# Patient Record
Sex: FEMALE | Race: WHITE | Employment: OTHER | URBAN - NONMETROPOLITAN AREA
[De-identification: names, ages, dates, MRNs, and addresses within clinical notes are randomized per-mention and may not be internally consistent; named-entity substitution may affect disease eponyms.]

---

## 2020-01-21 LAB
ALBUMIN SERPL-MCNC: NORMAL G/DL
ALP BLD-CCNC: NORMAL U/L
ALT SERPL-CCNC: NORMAL U/L
ANION GAP SERPL CALCULATED.3IONS-SCNC: NORMAL MMOL/L
AST SERPL-CCNC: NORMAL U/L
BILIRUB SERPL-MCNC: NORMAL MG/DL
BUN BLDV-MCNC: NORMAL MG/DL
CALCIUM SERPL-MCNC: NORMAL MG/DL
CHLORIDE BLD-SCNC: NORMAL MMOL/L
CHOLESTEROL, TOTAL: NORMAL
CHOLESTEROL/HDL RATIO: NORMAL
CO2: NORMAL
CREAT SERPL-MCNC: NORMAL MG/DL
GFR CALCULATED: NORMAL
GLUCOSE BLD-MCNC: NORMAL MG/DL
HDLC SERPL-MCNC: NORMAL MG/DL
LDL CHOLESTEROL CALCULATED: NORMAL
NONHDLC SERPL-MCNC: NORMAL MG/DL
POTASSIUM SERPL-SCNC: NORMAL MMOL/L
SODIUM BLD-SCNC: NORMAL MMOL/L
TOTAL PROTEIN: NORMAL
TRIGL SERPL-MCNC: NORMAL MG/DL
VLDLC SERPL CALC-MCNC: NORMAL MG/DL

## 2020-10-14 LAB — FECAL BLOOD IMMUNOCHEMICAL TEST: NORMAL

## 2021-09-30 ENCOUNTER — OFFICE VISIT (OUTPATIENT)
Dept: FAMILY MEDICINE CLINIC | Age: 76
End: 2021-09-30
Payer: MEDICARE

## 2021-09-30 VITALS
HEIGHT: 63 IN | TEMPERATURE: 96.8 F | HEART RATE: 60 BPM | SYSTOLIC BLOOD PRESSURE: 132 MMHG | BODY MASS INDEX: 25.16 KG/M2 | OXYGEN SATURATION: 98 % | WEIGHT: 142 LBS | DIASTOLIC BLOOD PRESSURE: 72 MMHG

## 2021-09-30 DIAGNOSIS — Z12.31 SCREENING MAMMOGRAM, ENCOUNTER FOR: ICD-10-CM

## 2021-09-30 DIAGNOSIS — E03.9 HYPOTHYROIDISM, UNSPECIFIED TYPE: ICD-10-CM

## 2021-09-30 DIAGNOSIS — F41.9 ANXIETY AND DEPRESSION: Primary | ICD-10-CM

## 2021-09-30 DIAGNOSIS — F32.A ANXIETY AND DEPRESSION: Primary | ICD-10-CM

## 2021-09-30 DIAGNOSIS — I10 HYPERTENSION, UNSPECIFIED TYPE: ICD-10-CM

## 2021-09-30 PROCEDURE — 99204 OFFICE O/P NEW MOD 45 MIN: CPT | Performed by: FAMILY MEDICINE

## 2021-09-30 RX ORDER — LISINOPRIL 40 MG/1
TABLET ORAL
COMMUNITY
Start: 2021-09-12 | End: 2022-06-10 | Stop reason: SDUPTHER

## 2021-09-30 RX ORDER — ESCITALOPRAM OXALATE 5 MG/1
5 TABLET ORAL DAILY
Qty: 30 TABLET | Refills: 1 | Status: SHIPPED
Start: 2021-09-30 | End: 2021-11-19 | Stop reason: SDUPTHER

## 2021-09-30 RX ORDER — LEVOTHYROXINE SODIUM 0.2 MG/1
TABLET ORAL
COMMUNITY
Start: 2021-09-12 | End: 2021-10-04

## 2021-09-30 RX ORDER — SIMVASTATIN 20 MG
TABLET ORAL
COMMUNITY
Start: 2021-09-26 | End: 2022-06-10 | Stop reason: SDUPTHER

## 2021-09-30 RX ORDER — HYDROCHLOROTHIAZIDE 25 MG/1
25 TABLET ORAL DAILY
COMMUNITY
End: 2021-09-30 | Stop reason: SDUPTHER

## 2021-09-30 RX ORDER — HYDROCHLOROTHIAZIDE 25 MG/1
25 TABLET ORAL DAILY
Qty: 90 TABLET | Refills: 1 | Status: SHIPPED
Start: 2021-09-30 | End: 2022-08-04 | Stop reason: SDUPTHER

## 2021-09-30 ASSESSMENT — PATIENT HEALTH QUESTIONNAIRE - PHQ9
SUM OF ALL RESPONSES TO PHQ9 QUESTIONS 1 & 2: 3
5. POOR APPETITE OR OVEREATING: 1
SUM OF ALL RESPONSES TO PHQ QUESTIONS 1-9: 10
1. LITTLE INTEREST OR PLEASURE IN DOING THINGS: 0
6. FEELING BAD ABOUT YOURSELF - OR THAT YOU ARE A FAILURE OR HAVE LET YOURSELF OR YOUR FAMILY DOWN: 1
3. TROUBLE FALLING OR STAYING ASLEEP: 1
8. MOVING OR SPEAKING SO SLOWLY THAT OTHER PEOPLE COULD HAVE NOTICED. OR THE OPPOSITE, BEING SO FIGETY OR RESTLESS THAT YOU HAVE BEEN MOVING AROUND A LOT MORE THAN USUAL: 1
9. THOUGHTS THAT YOU WOULD BE BETTER OFF DEAD, OR OF HURTING YOURSELF: 0
4. FEELING TIRED OR HAVING LITTLE ENERGY: 3
7. TROUBLE CONCENTRATING ON THINGS, SUCH AS READING THE NEWSPAPER OR WATCHING TELEVISION: 0
2. FEELING DOWN, DEPRESSED OR HOPELESS: 3
SUM OF ALL RESPONSES TO PHQ QUESTIONS 1-9: 10
SUM OF ALL RESPONSES TO PHQ QUESTIONS 1-9: 10
10. IF YOU CHECKED OFF ANY PROBLEMS, HOW DIFFICULT HAVE THESE PROBLEMS MADE IT FOR YOU TO DO YOUR WORK, TAKE CARE OF THINGS AT HOME, OR GET ALONG WITH OTHER PEOPLE: 1

## 2021-09-30 NOTE — PROGRESS NOTES
[address] presents to the office today for   Chief Complaint   Patient presents with    New Patient     Anxiety and Depression  Dog passed away 8 years ago  Has felt down and nervous since then  Dr. Celia Montana gave her medication that gave her diarrhea  Paxil and Zoloft she thinks    Hypertension  No chest pain or dyspnea    Hypothyroid  TSH due now    Diarrhea after restaurant meals  No blood in stool  FIT test negative last year  Normally has normal bowels    Review of Systems   Constitutional: Negative for chills and fever. Genitourinary: Negative for dysuria, hematuria and urgency. Skin: Negative for rash. Neurological: Negative for dizziness and light-headedness. /72   Pulse 60   Temp 96.8 °F (36 °C) (Temporal)   Ht 5' 3\" (1.6 m)   Wt 142 lb (64.4 kg)   SpO2 98%   BMI 25.15 kg/m²   Physical Exam  Constitutional:       Appearance: Normal appearance. HENT:      Head: Normocephalic and atraumatic. Eyes:      Extraocular Movements: Extraocular movements intact. Conjunctiva/sclera: Conjunctivae normal.   Cardiovascular:      Rate and Rhythm: Normal rate. Heart sounds: Normal heart sounds. Pulmonary:      Effort: Pulmonary effort is normal.      Breath sounds: Normal breath sounds. Skin:     General: Skin is warm. Neurological:      Mental Status: She is alert and oriented to person, place, and time.    Psychiatric:         Mood and Affect: Mood normal.         Behavior: Behavior normal.            Current Outpatient Medications:     lisinopril (PRINIVIL;ZESTRIL) 40 MG tablet, TAKE 1 TABLET BY MOUTH EVERY DAY, Disp: , Rfl:     levothyroxine (SYNTHROID) 200 MCG tablet, TAKE 1 TABLET BY MOUTH EVERY DAY, Disp: , Rfl:     simvastatin (ZOCOR) 20 MG tablet, , Disp: , Rfl:     hydroCHLOROthiazide (HYDRODIURIL) 25 MG tablet, Take 1 tablet by mouth daily, Disp: 90 tablet, Rfl: 1    escitalopram (LEXAPRO) 5 MG tablet, Take 1 tablet by mouth daily, Disp: 30 tablet, Rfl: 1     Past Medical History:   Diagnosis Date    [facility] ([facility] [address]     Anxiety     Depression     Hypercholesterolemia     Hypertension     Hypothyroidism     Sleep disorder        Faye Jj was seen today for new patient. Diagnoses and all orders for this visit:    Anxiety and depression  -     escitalopram (LEXAPRO) 5 MG tablet; Take 1 tablet by mouth daily    Screening mammogram, encounter for  -     [facility] TRINI DIGITAL SCREEN BILATERAL PER PROTOCOL; Future    Hypertension, unspecified type  -     Lipid Panel; Future  -     Comprehensive Metabolic Panel; Future    Hypothyroidism, unspecified type  -     TSH without Reflex; Future  -     T4, Free; Future    Other orders  -     hydroCHLOROthiazide (HYDRODIURIL) 25 MG tablet;  Take 1 tablet by mouth daily       Labs today  Mammogram  Start Lexapro 5 mg po qd    Jose Cruz Mauro MD

## 2021-10-04 DIAGNOSIS — E03.9 HYPOTHYROIDISM, UNSPECIFIED TYPE: Primary | ICD-10-CM

## 2021-10-04 RX ORDER — LEVOTHYROXINE SODIUM 175 UG/1
175 TABLET ORAL DAILY
Qty: 30 TABLET | Refills: 1 | Status: SHIPPED
Start: 2021-10-04 | End: 2021-11-19 | Stop reason: SDUPTHER

## 2021-11-19 ENCOUNTER — OFFICE VISIT (OUTPATIENT)
Dept: FAMILY MEDICINE CLINIC | Age: 76
End: 2021-11-19
Payer: MEDICARE

## 2021-11-19 VITALS
HEIGHT: 61 IN | TEMPERATURE: 97.1 F | DIASTOLIC BLOOD PRESSURE: 64 MMHG | HEART RATE: 71 BPM | WEIGHT: 144 LBS | OXYGEN SATURATION: 98 % | SYSTOLIC BLOOD PRESSURE: 138 MMHG | BODY MASS INDEX: 27.19 KG/M2

## 2021-11-19 DIAGNOSIS — E03.9 HYPOTHYROIDISM, UNSPECIFIED TYPE: ICD-10-CM

## 2021-11-19 DIAGNOSIS — F41.9 ANXIETY AND DEPRESSION: ICD-10-CM

## 2021-11-19 DIAGNOSIS — F32.A ANXIETY AND DEPRESSION: ICD-10-CM

## 2021-11-19 LAB
T4 FREE: 1.14 NG/DL (ref 0.93–1.7)
TSH SERPL DL<=0.05 MIU/L-ACNC: 0.01 UIU/ML (ref 0.27–4.2)

## 2021-11-19 PROCEDURE — 99214 OFFICE O/P EST MOD 30 MIN: CPT | Performed by: FAMILY MEDICINE

## 2021-11-19 RX ORDER — HYDROXYZINE HYDROCHLORIDE 25 MG/1
25 TABLET, FILM COATED ORAL EVERY 8 HOURS PRN
Qty: 90 TABLET | Refills: 1 | Status: SHIPPED
Start: 2021-11-19 | End: 2021-12-16

## 2021-11-19 RX ORDER — LEVOTHYROXINE SODIUM 175 UG/1
175 TABLET ORAL DAILY
Qty: 90 TABLET | Refills: 1 | Status: SHIPPED
Start: 2021-11-19 | End: 2021-11-23

## 2021-11-19 RX ORDER — ESCITALOPRAM OXALATE 5 MG/1
5 TABLET ORAL DAILY
Qty: 90 TABLET | Refills: 1 | Status: SHIPPED
Start: 2021-11-19 | End: 2022-08-01

## 2021-11-19 NOTE — PATIENT INSTRUCTIONS
Please continue the Escitalopram  Please take the new medication Hydroxyzine as needed for acute anxiety  Please get your thyroid blood test done today  Get your COVID booster at pharmacy  See Dr. Sheeba Elliott again 3 months

## 2021-11-19 NOTE — PROGRESS NOTES
[address] [facility] [address] presents to the office today for   Chief Complaint   Patient presents with    Anxiety     Anxiety and Depression  I started Lexapro last visit  She did not start for 11 days due to pharmacy  Gets stressed out riding in the car with her  driving    Hypothyroid  She is taking lower dose Levothyroxine 175 mcg  Due for TSH recheck today    Review of Systems     /64   Pulse 71   Temp 97.1 °F (36.2 °C) (Temporal)   Ht 5' 1\" (1.549 m)   Wt 144 lb (65.3 kg)   SpO2 98%   BMI 27.21 kg/m²   Physical Exam  Constitutional:       Appearance: Normal appearance. HENT:      Head: Normocephalic and atraumatic. Eyes:      Extraocular Movements: Extraocular movements intact. Conjunctiva/sclera: Conjunctivae normal.   Cardiovascular:      Rate and Rhythm: Normal rate. Heart sounds: Normal heart sounds. Pulmonary:      Effort: Pulmonary effort is normal.      Breath sounds: Normal breath sounds. Skin:     General: Skin is warm. Neurological:      Mental Status: She is alert and oriented to person, place, and time. Psychiatric:         Attention and Perception: Attention normal.         Mood and Affect: Mood normal.         Speech: Speech normal.         Behavior: Behavior normal.         Thought Content:  Thought content normal.         Cognition and Memory: Cognition normal.         Judgment: Judgment normal.            Current Outpatient Medications:     levothyroxine (SYNTHROID) 175 MCG tablet, Take 1 tablet by mouth daily, Disp: 90 tablet, Rfl: 1    escitalopram (LEXAPRO) 5 MG tablet, Take 1 tablet by mouth daily, Disp: 90 tablet, Rfl: 1    hydrOXYzine (ATARAX) 25 MG tablet, Take 1 tablet by mouth every 8 hours as needed for Anxiety, Disp: 90 tablet, Rfl: 1    lisinopril (PRINIVIL;ZESTRIL) 40 MG tablet, TAKE 1 TABLET BY MOUTH EVERY DAY, Disp: , Rfl:     simvastatin (ZOCOR) 20 MG tablet, , Disp: , Rfl:     hydroCHLOROthiazide (HYDRODIURIL) 25 MG tablet, Take 1 tablet by mouth daily, Disp: 90 tablet, Rfl: 1     Past Medical History:   Diagnosis Date    Alzheimer's disease ([facility] [address]     Anxiety     Depression     Hypercholesterolemia     Hypertension     Hypothyroidism     Sleep disorder        Lola Clinton was seen today for anxiety. Diagnoses and all orders for this visit:    Hypothyroidism, unspecified type  -     levothyroxine (SYNTHROID) 175 MCG tablet; Take 1 tablet by mouth daily    Anxiety and depression  -     escitalopram (LEXAPRO) 5 MG tablet; Take 1 tablet by mouth daily  -     hydrOXYzine (ATARAX) 25 MG tablet;  Take 1 tablet by mouth every 8 hours as needed for Anxiety       Please continue the Escitalopram  Please take the new medication Hydroxyzine as needed for acute anxiety  Please get your thyroid blood test done today  Get your COVID booster at pharmacy  See Dr. Deena Sam again 3 months    Keyana Cuenca MD

## 2021-11-23 DIAGNOSIS — E03.9 HYPOTHYROIDISM, UNSPECIFIED TYPE: ICD-10-CM

## 2021-11-23 RX ORDER — LEVOTHYROXINE SODIUM 0.15 MG/1
150 TABLET ORAL DAILY
Qty: 30 TABLET | Refills: 1 | Status: SHIPPED
Start: 2021-11-23 | End: 2021-12-16

## 2021-12-13 ENCOUNTER — TELEPHONE (OUTPATIENT)
Dept: FAMILY MEDICINE CLINIC | Age: 76
End: 2021-12-13

## 2021-12-13 NOTE — TELEPHONE ENCOUNTER
Patient called and stated when you saw you on 11/19/21 that she was supposed to take Levothyroxine 175mcg but the 150mcg was sent over. Which one is she supposed to take? She also is supposed to come in for blood work on 12/20/21 she said, should she still come and do that?

## 2021-12-16 DIAGNOSIS — E03.9 HYPOTHYROIDISM, UNSPECIFIED TYPE: ICD-10-CM

## 2021-12-16 DIAGNOSIS — F32.A ANXIETY AND DEPRESSION: ICD-10-CM

## 2021-12-16 DIAGNOSIS — F41.9 ANXIETY AND DEPRESSION: ICD-10-CM

## 2021-12-16 RX ORDER — HYDROXYZINE HYDROCHLORIDE 25 MG/1
25 TABLET, FILM COATED ORAL EVERY 8 HOURS PRN
Qty: 90 TABLET | Refills: 1 | Status: SHIPPED
Start: 2021-12-16 | End: 2022-01-12

## 2021-12-16 RX ORDER — LEVOTHYROXINE SODIUM 0.15 MG/1
TABLET ORAL
Qty: 90 TABLET | Refills: 1 | Status: SHIPPED
Start: 2021-12-16 | End: 2021-12-28

## 2021-12-16 NOTE — TELEPHONE ENCOUNTER
Last Appointment:  11/19/2021  Future Appointments   Date Time Provider Carmina Nielson   2/21/2022  2:00 PM Lilibeth Mott  [address] [place] [address]

## 2021-12-20 DIAGNOSIS — E03.9 HYPOTHYROIDISM, UNSPECIFIED TYPE: ICD-10-CM

## 2021-12-20 LAB
T4 FREE: 1.9 NG/DL (ref 0.93–1.7)
TSH SERPL DL<=0.05 MIU/L-ACNC: 0.01 UIU/ML (ref 0.27–4.2)

## 2021-12-28 DIAGNOSIS — E03.9 HYPOTHYROIDISM, UNSPECIFIED TYPE: ICD-10-CM

## 2021-12-28 RX ORDER — LEVOTHYROXINE SODIUM 137 UG/1
137 TABLET ORAL DAILY
Qty: 30 TABLET | Refills: 1 | Status: SHIPPED
Start: 2021-12-28 | End: 2022-08-04 | Stop reason: SDUPTHER

## 2022-01-12 DIAGNOSIS — F32.A ANXIETY AND DEPRESSION: ICD-10-CM

## 2022-01-12 DIAGNOSIS — F41.9 ANXIETY AND DEPRESSION: ICD-10-CM

## 2022-01-12 RX ORDER — HYDROXYZINE HYDROCHLORIDE 25 MG/1
25 TABLET, FILM COATED ORAL EVERY 8 HOURS PRN
Qty: 90 TABLET | Refills: 1 | Status: SHIPPED
Start: 2022-01-12 | End: 2022-02-03 | Stop reason: SDUPTHER

## 2022-01-12 NOTE — TELEPHONE ENCOUNTER
Last Appointment:  11/19/2021  Future Appointments   Date Time Provider Carmina Nielson   2/21/2022  2:00 PM Vianney Price  [address] [place] [address]

## 2022-02-03 DIAGNOSIS — F32.A ANXIETY AND DEPRESSION: ICD-10-CM

## 2022-02-03 DIAGNOSIS — F41.9 ANXIETY AND DEPRESSION: ICD-10-CM

## 2022-02-03 RX ORDER — HYDROXYZINE HYDROCHLORIDE 25 MG/1
25 TABLET, FILM COATED ORAL EVERY 8 HOURS PRN
Qty: 90 TABLET | Refills: 1 | Status: SHIPPED | OUTPATIENT
Start: 2022-02-03 | End: 2022-03-05

## 2022-02-03 NOTE — TELEPHONE ENCOUNTER
Last Appointment:  11/19/2021  Future Appointments   Date Time Provider Carmina Nielson   2/24/2022  1:00 PM Vianney Price  [address] [place] [address]

## 2022-02-24 ENCOUNTER — OFFICE VISIT (OUTPATIENT)
Dept: FAMILY MEDICINE CLINIC | Age: 77
End: 2022-02-24
Payer: MEDICARE

## 2022-02-24 VITALS
DIASTOLIC BLOOD PRESSURE: 70 MMHG | OXYGEN SATURATION: 96 % | TEMPERATURE: 97.2 F | WEIGHT: 148 LBS | SYSTOLIC BLOOD PRESSURE: 126 MMHG | HEIGHT: 61 IN | HEART RATE: 94 BPM | BODY MASS INDEX: 27.94 KG/M2

## 2022-02-24 DIAGNOSIS — E03.9 HYPOTHYROIDISM, UNSPECIFIED TYPE: ICD-10-CM

## 2022-02-24 DIAGNOSIS — E03.9 HYPOTHYROIDISM, UNSPECIFIED TYPE: Primary | ICD-10-CM

## 2022-02-24 DIAGNOSIS — F32.A ANXIETY AND DEPRESSION: ICD-10-CM

## 2022-02-24 DIAGNOSIS — I10 HYPERTENSION, UNSPECIFIED TYPE: ICD-10-CM

## 2022-02-24 DIAGNOSIS — F41.9 ANXIETY AND DEPRESSION: ICD-10-CM

## 2022-02-24 LAB
T4 FREE: 1.42 NG/DL (ref 0.93–1.7)
TSH SERPL DL<=0.05 MIU/L-ACNC: 0.01 UIU/ML (ref 0.27–4.2)

## 2022-02-24 PROCEDURE — 99214 OFFICE O/P EST MOD 30 MIN: CPT | Performed by: FAMILY MEDICINE

## 2022-02-24 NOTE — PROGRESS NOTES
[address] presents to the office today for   Chief Complaint   Patient presents with    Thyroid Problem     Anxiety and Depression  She is taking Lexapro  She is using Hydroxyzine prn     Hypothyroid  She is taking lower dose Levothyroxine 137 mcg  Due for TSH recheck today    Review of Systems   Constitutional: Negative for chills and fever. Skin: Negative for rash. Neurological: Negative for dizziness and light-headedness. /70   Pulse 94   Temp 97.2 °F (36.2 °C) (Temporal)   Ht 5' 1\" (1.549 m)   Wt 148 lb (67.1 kg)   SpO2 96%   BMI 27.96 kg/m²   Physical Exam  Constitutional:       Appearance: Normal appearance. HENT:      Head: Normocephalic and atraumatic. Eyes:      Extraocular Movements: Extraocular movements intact. Conjunctiva/sclera: Conjunctivae normal.   Cardiovascular:      Rate and Rhythm: Normal rate. Heart sounds: Normal heart sounds. Pulmonary:      Effort: Pulmonary effort is normal.      Breath sounds: Normal breath sounds. Skin:     General: Skin is warm. Neurological:      Mental Status: She is alert and oriented to person, place, and time.    Psychiatric:         Mood and Affect: Mood normal.         Behavior: Behavior normal.            Current Outpatient Medications:     hydrOXYzine (ATARAX) 25 MG tablet, TAKE 1 TABLET BY MOUTH EVERY 8 HOURS AS NEEDED FOR ANXIETY, Disp: 90 tablet, Rfl: 1    levothyroxine (SYNTHROID) 137 MCG tablet, Take 1 tablet by mouth Daily, Disp: 30 tablet, Rfl: 1    escitalopram (LEXAPRO) 5 MG tablet, Take 1 tablet by mouth daily, Disp: 90 tablet, Rfl: 1    lisinopril (PRINIVIL;ZESTRIL) 40 MG tablet, TAKE 1 TABLET BY MOUTH EVERY DAY, Disp: , Rfl:     simvastatin (ZOCOR) 20 MG tablet, , Disp: , Rfl:     hydroCHLOROthiazide (HYDRODIURIL) 25 MG tablet, Take 1 tablet by mouth daily, Disp: 90 tablet, Rfl: 1     Past Medical History:   Diagnosis Date    [facility]'s disease ([facility] [address]     Anxiety  Depression     Hypercholesterolemia     Hypertension     Hypothyroidism     Sleep disorder        Nery Blind was seen today for thyroid problem.     Diagnoses and all orders for this visit:    Hypothyroidism, unspecified type    Anxiety and depression    Hypertension, unspecified type       Check thyroid labs today  Recheck 6 months    Binta Caceres MD

## 2022-06-09 NOTE — TELEPHONE ENCOUNTER
[facility] HEALTH CLINICAL PHARMACY: ADHERENCE REVIEW  Identified care gap per [place]: fills at [facility]: ACE/ARB and Statin adherence    Last Visit: 22    ASSESSMENT  ACE/ARB ADHERENCE    Insurance Records claims through D Weston García = Filled only once; Potential Fail Date: 22):   LISINOPRIL 40MG. Next refill due: 22    Per Payor Portal:   last filled on 22 for 90 day supply. 3RF but . Will need new script    Per [facility]:   Will process refill along with her levothyroxine per patient request    BP Readings from Last 3 Encounters:   22 126/70   21 138/64   21 132/72     CrCl cannot be calculated (Patient's most recent lab result is older than the maximum 180 days allowed. ). [address] Records claims through 22  YTD Weston García = Filled only once; Potential Fail Date: 22):   SIMVASTATIN 20 MG. Next refill due: 22    Per Reconciled Dispense Report:  Same as above, LF 22 #90 3RF      Lab Results   Component Value Date    CHOL 153 2021    TRIG 142 2021    HDL 48 2021    LDLCALC 77 2021     ALT   Date Value Ref Range Status   2021 14 0 - 32 U/L Final     AST   Date Value Ref Range Status   2021 26 0 - 31 U/L Final     The 10-year ASCVD risk score (Mackenzie Jones, et al., 2013) is: 22.2%    Values used to calculate the score:      Age: 68 years      Sex: Female      Is Non- : No      Diabetic: No      Tobacco smoker: No      Systolic Blood Pressure: 631 mmHg      Is BP treated: Yes      HDL Cholesterol: 48 mg/dL      Total Cholesterol: 153 mg/dL     PLAN  The following are interventions that have been identified:   - Patient overdue refilling both and active on home medication list.     Reached patient to review. Verified medication instructions. Admits to sometimes forgetting. She will move the statin to morning-thinks that will work better for her.   Would like a refill on lisinopril and her thyroid. Both need new 90 day scripts. Verified levothyroxine 200mcg QD. She will  this weekend. [facility]    No future appointments. Lucy Garsia CPhT. [place] [address] [address] free: [phone]    Will route to PharmD to facilitate refill authorizations.

## 2022-06-10 ENCOUNTER — TELEPHONE (OUTPATIENT)
Dept: PHARMACY | Facility: CLINIC | Age: 77
End: 2022-06-10

## 2022-06-10 DIAGNOSIS — F32.A ANXIETY AND DEPRESSION: ICD-10-CM

## 2022-06-10 DIAGNOSIS — F41.9 ANXIETY AND DEPRESSION: ICD-10-CM

## 2022-06-10 NOTE — TELEPHONE ENCOUNTER
Renee Hodgkin, MD - patient was asking for refill of levothyroxine. Wanted to confirm what dose you would like her to take? · She has NOT been taking 137 mcg tab as instructed/current med list (confirmed with pharmacy this dose was never filled). She has apparently been using 200 mcg tabs. · Last lab 2/24/22 TSH low, FT4 WNL but does not appear to reflect 137 mcg daily. Likely was taking 150 mcg or possibly 200 mcg tabs at the time. Per most recent discussion with patient (see 6/9/22 pharmacy encounter) has been using 200 mcg daily. I believe labs prior to 2/24/22 do reflect the doses you had instructed her to take at that time    Please advise and I can follow up with the patient if you would like. She has multiple active Rxs/doses at pharmacy - I can have them get ready the dose you would like her to take and deactivate other doses to avoid confusion in the future. Thank you,  Geoff Quiroz, PharmD, [facility]  Department, toll free: [phone]   =================================================================  See 6/9/22 telephone encounter for continuous care. Pt was asking for refill of levothyroxine 200 mcg tab but noted levothyroxine 137 mcg tab on current med list.    Spoke with pharmacy to confirm Rx history:  - levothyroxine was last filled in January for 200 mcg. Rx on hold for 137 mcg - has never been filled but still active. Also has active on profile for 150 mcg. Per review:   - pt was on levothyroxine 200 mcg daily when transferred to practice 9/30/21 OV  - dose decreased to 175 mcg 9/30/21 (lab result note). Rx for 175 mcg filled 10/7/22, 11/3 x 30 ds each  - dose decreased to 150 mcg 11/19/21 (lab result note). Rx for 150 mcg tab filled 11/23/21 x 30 ds, 12/16/22 x 90 ds  - dose decreased to 137 mcg 12/20/21 (lab result note).  Rx for 137 mcg tab was never filled   - 2/24/22 TSH low, FT4 WNL but does not appear to reflect 137 mcg daily. Likely was taking 150 mcg or possibly 200 mcg tabs at the time. Per most recent discussion with patient has been using 200 mcg daily    Fill history per reconcile dispense:  LEVOTHYROXINE 200 MCG TABLET 01/02/2022 90 90 each WITTENAUER,VENUS ABDOUL [id number]- B. .. LEVOTHYROXINE 150 MCG TABLET 12/16/2021 90 [id number] .. JEANETTE LEVOTHYROXINE SODIUM 175MCG TAB 12/07/2021 90 90 tablet [id number]. .. LEVOTHYROXINE 150 MCG TABLET 11/23/2021 30 30 each HAYLIE LEZAMA [facility]/pharmacy #[id number]- B. .. LEVOTHYROXINE 175 MCG TABLET 11/03/2021 30 30 each HAYLIE LEZAMA [facility]/pharmacy #[id number]- B. .. LEVOTHYROXINE 175 MCG TABLET 10/07/2021 30 30 each DELTAHAYLIE [facility]/pharmacy #[id number]- B. .. LEVOTHYROXINE 200 MCG TABLET 09/12/2021 90 90 [id number]. .. LEVOTHYROXINE 200 MCG TABLET 06/04/2021 90 90 each WITTENAUER,VENUS ABDOUL [id number]- B. ..      Component      Latest Ref Rng & Units 2/24/2022 2/24/2022 12/20/2021 12/20/2021           1:22 PM  1:22 PM  2:19 PM  2:19 PM   T4 Free      0.93 - 1.70 ng/dL 1.42   1.90 (H)   TSH      0.270 - 4.200 uIU/mL  0.013 (L) 0.012 (L)      Component      Latest Ref Rng & Units 11/19/2021 11/19/2021 9/30/2021 9/30/2021          11:23 AM 11:23 AM  2:01 PM  2:01 PM   T4 Free      0.93 - 1.70 ng/dL 1.14   2.46 (H)   TSH      0.270 - 4.200 uIU/mL  0.014 (L) 0.010 (L)

## 2022-06-10 NOTE — TELEPHONE ENCOUNTER
Aimee Cantu MD, patient out of refills of lisinopril & simvastatin. Rx pended for your signature/modification as appropriate    LOV: 2/24/22  Next: to be scheduled    Thank you,  Jayne Bolden, PharmD, [facility]  Department, toll free: [phone]   ==========================================================  Per review meds Rxed from Dr. Kika Tovar. Appears pt transferred from Dr. Kika Tovar to Dr. Ksenia Oliver 9/30/2021. Confirmed with pharmacy new Rxs needed for lisinopril & simvastatin fills. See 6/10/22 encounter for further information regarding levothyroxine - do not believe has been taking prescribed dose of 137 mcg daily, last filled for 200 mcg tab    LISINOPRIL 40 MG TABLET 11/28/2021 90 [id number] .. LISINOPRIL 40 MG TABLET 09/12/2021 90 90 each [facility]/pharmacy #[id number]- B. .. SIMVASTATIN 20 MG TABLET 01/02/2022 90 90 each [facility]/pharmacy #[id number]- B. .. SIMVASTATIN 20 MG TABLET 09/26/2021 90 90 each [facility]/pharmacy #[id number]- B. .. SIMVASTATIN 20 MG TABLET 06/27/2021 90 90 [id number] B. .. SIMVASTATIN 20 MG TABLET 03/27/2021 90 90 each [facility]/pharmacy #[id number]- B. .. SIMVASTATIN 20 MG TABLET 12/26/2020 90 90 each [facility]/pharmacy #[id number]- B. ..

## 2022-06-13 RX ORDER — LISINOPRIL 40 MG/1
TABLET ORAL
Qty: 90 TABLET | Refills: 0 | Status: SHIPPED
Start: 2022-06-13 | End: 2022-08-04 | Stop reason: SDUPTHER

## 2022-06-13 RX ORDER — SIMVASTATIN 20 MG
20 TABLET ORAL NIGHTLY
Qty: 90 TABLET | Refills: 0 | Status: SHIPPED
Start: 2022-06-13 | End: 2022-08-04 | Stop reason: SDUPTHER

## 2022-06-14 NOTE — TELEPHONE ENCOUNTER
Please advise patient to  Levothyroxine 137 mcg per day and we will need to recheck in 6 weeks on this dose  Please confirm she can get it filled and get lab done and I will send new Rx if needed and place order for labs

## 2022-06-15 NOTE — TELEPHONE ENCOUNTER
Patient advised and will  the script this week. Once she does that, she will call and schedule the 6 week follow up.

## 2022-06-17 NOTE — TELEPHONE ENCOUNTER
Noted Rxs signed by provider - thank you! Regarding levothyroxine was advised to take levothyroxine 137 mcg daily and recheck in 6 weeks. Called to [facility] pharmacy. Levothyroxine 137 was on hold - they will fill. Had them also deactivate other strengths. Confirmed simvastatin & lisinopril picked up .     For Jose Samayoa in place:  No   Recommendation Provided To: Provider: 3 via Note to Provider, Patient/Caregiver: 2 via Telephone and Pharmacy: 2   Intervention Detail: Adherence Monitorin, Discontinued Rx: 1, reason: Therapy Complete and Refill(s) Provided   Gap Closed?: Yes    Intervention Accepted By: Provider: 3, Patient/Caregiver: 2 and Pharmacy: 2   Time Spent (min): 30

## 2022-06-17 NOTE — TELEPHONE ENCOUNTER
Provider response and staff outreach noted - thank you! Called to [facility], updated pt profile - they will fill levothyroxine 137 mcg tab and deactivate other strengths.

## 2022-07-30 DIAGNOSIS — F32.A ANXIETY AND DEPRESSION: ICD-10-CM

## 2022-07-30 DIAGNOSIS — F41.9 ANXIETY AND DEPRESSION: ICD-10-CM

## 2022-08-01 RX ORDER — ESCITALOPRAM OXALATE 5 MG/1
TABLET ORAL
Qty: 90 TABLET | Refills: 1 | Status: SHIPPED
Start: 2022-08-01 | End: 2022-08-04 | Stop reason: SDUPTHER

## 2022-08-01 NOTE — TELEPHONE ENCOUNTER
Last Appointment:  2/24/2022  Future Appointments   Date Time Provider Carmina Nielson   8/4/2022  2:00 PM Quiana Shahid  [address] [place] [address]

## 2022-08-04 ENCOUNTER — OFFICE VISIT (OUTPATIENT)
Dept: FAMILY MEDICINE CLINIC | Age: 77
End: 2022-08-04
Payer: MEDICARE

## 2022-08-04 VITALS
WEIGHT: 148 LBS | SYSTOLIC BLOOD PRESSURE: 124 MMHG | HEIGHT: 61 IN | OXYGEN SATURATION: 98 % | HEART RATE: 58 BPM | DIASTOLIC BLOOD PRESSURE: 68 MMHG | RESPIRATION RATE: 15 BRPM | BODY MASS INDEX: 27.94 KG/M2 | TEMPERATURE: 97.1 F

## 2022-08-04 DIAGNOSIS — F41.9 ANXIETY AND DEPRESSION: ICD-10-CM

## 2022-08-04 DIAGNOSIS — I10 HYPERTENSION, UNSPECIFIED TYPE: ICD-10-CM

## 2022-08-04 DIAGNOSIS — Z00.00 PREVENTATIVE HEALTH CARE: ICD-10-CM

## 2022-08-04 DIAGNOSIS — E03.9 HYPOTHYROIDISM, UNSPECIFIED TYPE: ICD-10-CM

## 2022-08-04 DIAGNOSIS — E03.9 HYPOTHYROIDISM, UNSPECIFIED TYPE: Primary | ICD-10-CM

## 2022-08-04 DIAGNOSIS — F32.A ANXIETY AND DEPRESSION: ICD-10-CM

## 2022-08-04 LAB
BASOPHILS ABSOLUTE: 0.04 E9/L (ref 0–0.2)
BASOPHILS RELATIVE PERCENT: 0.7 % (ref 0–2)
EOSINOPHILS ABSOLUTE: 0.1 E9/L (ref 0.05–0.5)
EOSINOPHILS RELATIVE PERCENT: 1.9 % (ref 0–6)
HCT VFR BLD CALC: 46.6 % (ref 34–48)
HEMOGLOBIN: 15.1 G/DL (ref 11.5–15.5)
IMMATURE GRANULOCYTES #: 0.02 E9/L
IMMATURE GRANULOCYTES %: 0.4 % (ref 0–5)
LYMPHOCYTES ABSOLUTE: 1.82 E9/L (ref 1.5–4)
LYMPHOCYTES RELATIVE PERCENT: 33.7 % (ref 20–42)
MCH RBC QN AUTO: 30.3 PG (ref 26–35)
MCHC RBC AUTO-ENTMCNC: 32.4 % (ref 32–34.5)
MCV RBC AUTO: 93.4 FL (ref 80–99.9)
MONOCYTES ABSOLUTE: 0.72 E9/L (ref 0.1–0.95)
MONOCYTES RELATIVE PERCENT: 13.3 % (ref 2–12)
NEUTROPHILS ABSOLUTE: 2.7 E9/L (ref 1.8–7.3)
NEUTROPHILS RELATIVE PERCENT: 50 % (ref 43–80)
PDW BLD-RTO: 14 FL (ref 11.5–15)
PLATELET # BLD: 195 E9/L (ref 130–450)
PMV BLD AUTO: 11 FL (ref 7–12)
RBC # BLD: 4.99 E12/L (ref 3.5–5.5)
T4 FREE: 2.5 NG/DL (ref 0.93–1.7)
TSH SERPL DL<=0.05 MIU/L-ACNC: 0.01 UIU/ML (ref 0.27–4.2)
WBC # BLD: 5.4 E9/L (ref 4.5–11.5)

## 2022-08-04 PROCEDURE — 99214 OFFICE O/P EST MOD 30 MIN: CPT | Performed by: FAMILY MEDICINE

## 2022-08-04 PROCEDURE — 1123F ACP DISCUSS/DSCN MKR DOCD: CPT | Performed by: FAMILY MEDICINE

## 2022-08-04 RX ORDER — HYDROCHLOROTHIAZIDE 25 MG/1
25 TABLET ORAL DAILY
Qty: 90 TABLET | Refills: 1 | Status: SHIPPED | OUTPATIENT
Start: 2022-08-04

## 2022-08-04 RX ORDER — LISINOPRIL 40 MG/1
TABLET ORAL
Qty: 90 TABLET | Refills: 1 | Status: SHIPPED
Start: 2022-08-04 | End: 2022-09-12 | Stop reason: SDUPTHER

## 2022-08-04 RX ORDER — ESCITALOPRAM OXALATE 5 MG/1
TABLET ORAL
Qty: 90 TABLET | Refills: 1 | Status: SHIPPED | OUTPATIENT
Start: 2022-08-04

## 2022-08-04 RX ORDER — LEVOTHYROXINE SODIUM 137 UG/1
137 TABLET ORAL DAILY
Qty: 90 TABLET | Refills: 1 | Status: SHIPPED
Start: 2022-08-04 | End: 2022-08-05

## 2022-08-04 RX ORDER — SIMVASTATIN 20 MG
20 TABLET ORAL NIGHTLY
Qty: 90 TABLET | Refills: 1 | Status: SHIPPED | OUTPATIENT
Start: 2022-08-04

## 2022-08-04 ASSESSMENT — PATIENT HEALTH QUESTIONNAIRE - PHQ9
10. IF YOU CHECKED OFF ANY PROBLEMS, HOW DIFFICULT HAVE THESE PROBLEMS MADE IT FOR YOU TO DO YOUR WORK, TAKE CARE OF THINGS AT HOME, OR GET ALONG WITH OTHER PEOPLE: 1
6. FEELING BAD ABOUT YOURSELF - OR THAT YOU ARE A FAILURE OR HAVE LET YOURSELF OR YOUR FAMILY DOWN: 0
SUM OF ALL RESPONSES TO PHQ QUESTIONS 1-9: 12
2. FEELING DOWN, DEPRESSED OR HOPELESS: 1
SUM OF ALL RESPONSES TO PHQ QUESTIONS 1-9: 12
SUM OF ALL RESPONSES TO PHQ QUESTIONS 1-9: 12
1. LITTLE INTEREST OR PLEASURE IN DOING THINGS: 1
4. FEELING TIRED OR HAVING LITTLE ENERGY: 2
SUM OF ALL RESPONSES TO PHQ9 QUESTIONS 1 & 2: 2
SUM OF ALL RESPONSES TO PHQ QUESTIONS 1-9: 12
9. THOUGHTS THAT YOU WOULD BE BETTER OFF DEAD, OR OF HURTING YOURSELF: 0
7. TROUBLE CONCENTRATING ON THINGS, SUCH AS READING THE NEWSPAPER OR WATCHING TELEVISION: 3
3. TROUBLE FALLING OR STAYING ASLEEP: 1
8. MOVING OR SPEAKING SO SLOWLY THAT OTHER PEOPLE COULD HAVE NOTICED. OR THE OPPOSITE, BEING SO FIGETY OR RESTLESS THAT YOU HAVE BEEN MOVING AROUND A LOT MORE THAN USUAL: 2
5. POOR APPETITE OR OVEREATING: 2

## 2022-08-04 NOTE — PROGRESS NOTES
[address] presents to the office today for   Chief Complaint   Patient presents with    Thyroid Problem       Anxiety and Depression  She is taking Lexapro  She is using Hydroxyzine prn     Hypothyroid  She is taking lower dose Levothyroxine 137 mcg  Due for TSH recheck today    Hypertension  Taking meds as Rx  No chest pain or dyspnea    Review of Systems   Constitutional:  Negative for chills and fever. Genitourinary:  Negative for dysuria, hematuria and urgency. Skin:  Negative for rash. Neurological:  Negative for dizziness and light-headedness. /68   Pulse 58   Temp 97.1 °F (36.2 °C) (Temporal)   Resp 15   Ht 5' 1\" (1.549 m)   Wt 148 lb (67.1 kg)   SpO2 98%   BMI 27.96 kg/m²   Physical Exam  Constitutional:       Appearance: Normal appearance. HENT:      Head: Normocephalic and atraumatic. Eyes:      Extraocular Movements: Extraocular movements intact. Conjunctiva/sclera: Conjunctivae normal.   Cardiovascular:      Rate and Rhythm: Normal rate. Heart sounds: Normal heart sounds. Pulmonary:      Effort: Pulmonary effort is normal.      Breath sounds: Normal breath sounds. Skin:     General: Skin is warm. Neurological:      Mental Status: She is alert and oriented to person, place, and time.    Psychiatric:         Mood and Affect: Mood normal.         Behavior: Behavior normal.          Current Outpatient Medications:     simvastatin (ZOCOR) 20 MG tablet, Take 1 tablet by mouth nightly, Disp: 90 tablet, Rfl: 1    lisinopril (PRINIVIL;ZESTRIL) 40 MG tablet, TAKE 1 TABLET BY MOUTH EVERY DAY, Disp: 90 tablet, Rfl: 1    levothyroxine (SYNTHROID) 137 MCG tablet, Take 1 tablet by mouth in the morning., Disp: 90 tablet, Rfl: 1    hydroCHLOROthiazide (HYDRODIURIL) 25 MG tablet, Take 1 tablet by mouth in the morning., Disp: 90 tablet, Rfl: 1    escitalopram (LEXAPRO) 5 MG tablet, TAKE 1 TABLET BY MOUTH EVERY DAY, Disp: 90 tablet, Rfl: 1 Past Medical History:   Diagnosis Date    Alzheimer's disease ([facility] [address]     Anxiety     Depression     Hypercholesterolemia     Hypertension     Hypothyroidism     Sleep disorder        Richard Salas was seen today for thyroid problem. Diagnoses and all orders for this visit:    Hypertension, unspecified type    Hypothyroidism, unspecified type  -     levothyroxine (SYNTHROID) 137 MCG tablet; Take 1 tablet by mouth in the morning.  -     TSH; Future  -     T4, Free; Future    Anxiety and depression  -     escitalopram (LEXAPRO) 5 MG tablet; TAKE 1 TABLET BY MOUTH EVERY DAY    Preventative health care  -     CBC with Auto Differential; Future    Other orders  -     simvastatin (ZOCOR) 20 MG tablet; Take 1 tablet by mouth nightly  -     lisinopril (PRINIVIL;ZESTRIL) 40 MG tablet; TAKE 1 TABLET BY MOUTH EVERY DAY  -     hydroCHLOROthiazide (HYDRODIURIL) 25 MG tablet; Take 1 tablet by mouth in the morning.      Labs stable  If thyroid in range recheck 6 months    Krystle Velásquez MD

## 2022-08-05 DIAGNOSIS — E03.9 HYPOTHYROIDISM, UNSPECIFIED TYPE: ICD-10-CM

## 2022-08-05 DIAGNOSIS — E05.90 HYPERTHYROIDISM: Primary | ICD-10-CM

## 2022-08-05 RX ORDER — LEVOTHYROXINE SODIUM 0.12 MG/1
125 TABLET ORAL DAILY
Qty: 30 TABLET | Refills: 1 | Status: SHIPPED
Start: 2022-08-05 | End: 2022-09-28

## 2022-08-12 ENCOUNTER — TELEPHONE (OUTPATIENT)
Dept: FAMILY MEDICINE CLINIC | Age: 77
End: 2022-08-12

## 2022-08-12 NOTE — TELEPHONE ENCOUNTER
----- Message from Babar Hernandez sent at 8/12/2022  2:09 PM EDT -----  Subject: Message to Provider    QUESTIONS  Information for Provider? Patient was in recently and told she needs to   come back for a thyroid test. ekaterina pt   ---------------------------------------------------------------------------  --------------  Mary Burgess INFO  [phone]; OK to leave message on voicemail  ---------------------------------------------------------------------------  --------------  SCRIPT ANSWERS  Relationship to Patient?  Self

## 2022-08-30 ENCOUNTER — TELEPHONE (OUTPATIENT)
Dept: PHARMACY | Facility: CLINIC | Age: 77
End: 2022-08-30

## 2022-08-30 NOTE — TELEPHONE ENCOUNTER
POPULATION HEALTH CLINICAL PHARMACY: ADHERENCE REVIEW  Identified care gap per [place]: fills at [facility]: ACE/ARB and Statin adherence    Last Visit: 8/4/22    Patient not found in Outcomes MTM    [address] Records claims through 8/22/22; TAE García =  89%; Potential Fail Date: 10/25/22 ):   LISINOPRIL TAB 40MG due to refill 9/11/22 for 90 day supply. Per [facility]:   LISINOPRIL TAB 40MG last picked up on 6/14/22 for 90 day supply. 0 refills remaining. Billed through [employer]. New rx on file for next fill due 9/13/22    BP Readings from Last 3 Encounters:   08/04/22 124/68   02/24/22 126/70   11/19/21 138/64     CrCl cannot be calculated (Patient's most recent lab result is older than the maximum 180 days allowed. ). [address] Records claims through 8/22/22; YTD [place] =  69%; Potential Fail Date: 9/12/22 ):   SIMVASTATIN 20 MG TABLET due to refill 9/12/22 for 90 day supply. Per [facility]:   SIMVASTATIN 20 MG TABLET last picked up on 6/14/22 for 90 day supply. 0 refills remaining. Billed through [employer] [facility]. New rx on file for next fill due 9/13/22    Lab Results   Component Value Date    CHOL 153 09/30/2021    TRIG 142 09/30/2021    HDL 48 09/30/2021    LDLCALC 77 09/30/2021     ALT   Date Value Ref Range Status   09/30/2021 14 0 - 32 U/L Final     AST   Date Value Ref Range Status   09/30/2021 26 0 - 31 U/L Final     The 10-year ASCVD risk score (Jerri Link, et al., 2013) is: 21.6%    Values used to calculate the score:      Age: 68 years      Sex: Female      Is Non- : No      Diabetic: No      Tobacco smoker: No      Systolic Blood Pressure: 747 mmHg      Is BP treated: Yes      HDL Cholesterol: 48 mg/dL      Total Cholesterol: 153 mg/dL     PLAN    Lisinopril and Simvastatin both due for refill mid September. Pharmacy has new rx on file for both.  Will call pharmacy closer to this date to make sure they are filled and then call patient

## 2022-09-07 NOTE — TELEPHONE ENCOUNTER
Francine Rodriguez called back. Let her know both her simvastatin and Lisinopril were ready for  at [facility]. She also inquired about her Thyroid test, she was unaware that the levothyroxine was decreased on 2022 but she did pick it up and has been taking the 125mcg. I called [facility] to confirm the Levothyroxine was also ready. - confirmed it is ready  Also has:  Asked for escitalopram and hctz to be refilled as well    She said she would pick the medications up Thursday or Friday     Becky Garcia  PharmD, [facility]  Department, toll free: [phone], option 1    For Pharmacy [address] [facility] in place:  No  Recommendation Provided To: Patient/Caregiver: 3 via Telephone and Pharmacy: 2  Intervention Detail: Adherence Monitorin  Gap Closed?: Yes   Intervention Accepted By: Patient/Caregiver: 3 and Pharmacy: 2  Time Spent (min): 20

## 2022-09-07 NOTE — TELEPHONE ENCOUNTER
Called pharmacy and they already had Lisinopril and Simvastatin filled and waiting for . Called patient and left VM. Need tor remind to  prescriptions.        Shekhar Rice, [address] [facility]   Phone: [phone]

## 2022-09-12 RX ORDER — LISINOPRIL 40 MG/1
TABLET ORAL
Qty: 90 TABLET | Refills: 1 | Status: SHIPPED | OUTPATIENT
Start: 2022-09-12

## 2022-09-24 DIAGNOSIS — E03.9 HYPOTHYROIDISM, UNSPECIFIED TYPE: ICD-10-CM

## 2022-09-28 RX ORDER — LEVOTHYROXINE SODIUM 0.12 MG/1
TABLET ORAL
Qty: 30 TABLET | Refills: 0 | Status: SHIPPED
Start: 2022-09-28 | End: 2022-10-18

## 2022-09-28 NOTE — TELEPHONE ENCOUNTER
Patient was asked to follow up in one month on 8/4. I do not see a follow up. Do you want her to follow up w/ you in the next 30 days?       Thyroid US was normal

## 2022-10-10 ENCOUNTER — OFFICE VISIT (OUTPATIENT)
Dept: FAMILY MEDICINE CLINIC | Age: 77
End: 2022-10-10
Payer: MEDICARE

## 2022-10-10 VITALS
TEMPERATURE: 96.9 F | WEIGHT: 154 LBS | HEIGHT: 61 IN | HEART RATE: 60 BPM | DIASTOLIC BLOOD PRESSURE: 70 MMHG | RESPIRATION RATE: 15 BRPM | OXYGEN SATURATION: 96 % | BODY MASS INDEX: 29.07 KG/M2 | SYSTOLIC BLOOD PRESSURE: 132 MMHG

## 2022-10-10 DIAGNOSIS — E03.9 HYPOTHYROIDISM, UNSPECIFIED TYPE: ICD-10-CM

## 2022-10-10 DIAGNOSIS — R09.81 SINUS CONGESTION: ICD-10-CM

## 2022-10-10 DIAGNOSIS — I10 HYPERTENSION, UNSPECIFIED TYPE: ICD-10-CM

## 2022-10-10 DIAGNOSIS — F41.9 ANXIETY AND DEPRESSION: ICD-10-CM

## 2022-10-10 DIAGNOSIS — F32.A ANXIETY AND DEPRESSION: ICD-10-CM

## 2022-10-10 DIAGNOSIS — I10 HYPERTENSION, UNSPECIFIED TYPE: Primary | ICD-10-CM

## 2022-10-10 LAB
ALBUMIN SERPL-MCNC: 4.1 G/DL (ref 3.5–5.2)
ALP BLD-CCNC: 66 U/L (ref 35–104)
ALT SERPL-CCNC: 13 U/L (ref 0–32)
ANION GAP SERPL CALCULATED.3IONS-SCNC: 14 MMOL/L (ref 7–16)
AST SERPL-CCNC: 23 U/L (ref 0–31)
BASOPHILS ABSOLUTE: 0.04 E9/L (ref 0–0.2)
BASOPHILS RELATIVE PERCENT: 0.7 % (ref 0–2)
BILIRUB SERPL-MCNC: 0.4 MG/DL (ref 0–1.2)
BUN BLDV-MCNC: 16 MG/DL (ref 6–23)
CALCIUM SERPL-MCNC: 9.6 MG/DL (ref 8.6–10.2)
CHLORIDE BLD-SCNC: 107 MMOL/L (ref 98–107)
CO2: 24 MMOL/L (ref 22–29)
CREAT SERPL-MCNC: 0.8 MG/DL (ref 0.5–1)
EOSINOPHILS ABSOLUTE: 0.09 E9/L (ref 0.05–0.5)
EOSINOPHILS RELATIVE PERCENT: 1.6 % (ref 0–6)
GFR AFRICAN AMERICAN: >60
GFR NON-AFRICAN AMERICAN: >60 ML/MIN/1.73
GLUCOSE BLD-MCNC: 90 MG/DL (ref 74–99)
HCT VFR BLD CALC: 49.8 % (ref 34–48)
HEMOGLOBIN: 15.8 G/DL (ref 11.5–15.5)
IMMATURE GRANULOCYTES #: 0.02 E9/L
IMMATURE GRANULOCYTES %: 0.4 % (ref 0–5)
LYMPHOCYTES ABSOLUTE: 1.3 E9/L (ref 1.5–4)
LYMPHOCYTES RELATIVE PERCENT: 23.2 % (ref 20–42)
MCH RBC QN AUTO: 30.6 PG (ref 26–35)
MCHC RBC AUTO-ENTMCNC: 31.7 % (ref 32–34.5)
MCV RBC AUTO: 96.5 FL (ref 80–99.9)
MONOCYTES ABSOLUTE: 0.64 E9/L (ref 0.1–0.95)
MONOCYTES RELATIVE PERCENT: 11.4 % (ref 2–12)
NEUTROPHILS ABSOLUTE: 3.52 E9/L (ref 1.8–7.3)
NEUTROPHILS RELATIVE PERCENT: 62.7 % (ref 43–80)
PDW BLD-RTO: 13.6 FL (ref 11.5–15)
PLATELET # BLD: 190 E9/L (ref 130–450)
PMV BLD AUTO: 10.9 FL (ref 7–12)
POTASSIUM SERPL-SCNC: 4.2 MMOL/L (ref 3.5–5)
RBC # BLD: 5.16 E12/L (ref 3.5–5.5)
SODIUM BLD-SCNC: 145 MMOL/L (ref 132–146)
T4 FREE: 2.22 NG/DL (ref 0.93–1.7)
TOTAL PROTEIN: 7.2 G/DL (ref 6.4–8.3)
TSH SERPL DL<=0.05 MIU/L-ACNC: 0.01 UIU/ML (ref 0.27–4.2)
VITAMIN B-12: 415 PG/ML (ref 211–946)
WBC # BLD: 5.6 E9/L (ref 4.5–11.5)

## 2022-10-10 PROCEDURE — 1123F ACP DISCUSS/DSCN MKR DOCD: CPT | Performed by: FAMILY MEDICINE

## 2022-10-10 PROCEDURE — 99214 OFFICE O/P EST MOD 30 MIN: CPT | Performed by: FAMILY MEDICINE

## 2022-10-10 RX ORDER — AMOXICILLIN AND CLAVULANATE POTASSIUM 875; 125 MG/1; MG/1
1 TABLET, FILM COATED ORAL 2 TIMES DAILY
Qty: 20 TABLET | Refills: 0 | Status: SHIPPED | OUTPATIENT
Start: 2022-10-10 | End: 2022-10-20

## 2022-10-10 RX ORDER — FEXOFENADINE HCL 180 MG/1
180 TABLET ORAL DAILY
Qty: 90 TABLET | Refills: 1 | Status: SHIPPED | OUTPATIENT
Start: 2022-10-10

## 2022-10-10 RX ORDER — FLUTICASONE PROPIONATE 50 MCG
2 SPRAY, SUSPENSION (ML) NASAL DAILY
Qty: 48 G | Refills: 1 | Status: SHIPPED | OUTPATIENT
Start: 2022-10-10

## 2022-10-10 NOTE — PROGRESS NOTES
[address] presents to the office today for   Chief Complaint   Patient presents with    Thyroid Problem     Anxiety and Depression  She is taking Lexapro  She is using Hydroxyzine prn     Hypothyroid  She thinks she is taking Levothyroxine 125 mcg  Due for TSH recheck today     Hypertension  Taking meds as Rx  No chest pain or dyspnea    Congestion and congestion  Fever initially several weeks ago  Still with sinus congestion  She is wondering now if allergies  No dyspnea  No recent fever    Review of Systems     /70   Pulse 60   Temp 96.9 °F (36.1 °C) (Temporal)   Resp 15   Ht 5' 1\" (1.549 m)   Wt 154 lb (69.9 kg)   SpO2 96%   BMI 29.10 kg/m²   Physical Exam  Constitutional:       Appearance: Normal appearance. HENT:      Head: Normocephalic and atraumatic. Nose: Congestion present. Eyes:      Extraocular Movements: Extraocular movements intact. Conjunctiva/sclera: Conjunctivae normal.   Cardiovascular:      Rate and Rhythm: Normal rate. Heart sounds: Normal heart sounds. Pulmonary:      Effort: Pulmonary effort is normal.      Breath sounds: Normal breath sounds. Skin:     General: Skin is warm. Neurological:      Mental Status: She is alert and oriented to person, place, and time.    Psychiatric:         Mood and Affect: Mood normal.         Behavior: Behavior normal.          Current Outpatient Medications:     amoxicillin-clavulanate (AUGMENTIN) 875-125 MG per tablet, Take 1 tablet by mouth 2 times daily for 10 days, Disp: 20 tablet, Rfl: 0    fexofenadine (ALLEGRA) 180 MG tablet, Take 1 tablet by mouth daily, Disp: 90 tablet, Rfl: 1    fluticasone (FLONASE) 50 MCG/ACT nasal spray, 2 sprays by Each Nostril route daily, Disp: 48 g, Rfl: 1    levothyroxine (SYNTHROID) 125 MCG tablet, TAKE 1 TABLET BY MOUTH EVERY DAY IN THE MORNING, Disp: 30 tablet, Rfl: 0    lisinopril (PRINIVIL;ZESTRIL) 40 MG tablet, TAKE 1 TABLET BY MOUTH EVERY DAY, Disp: 90 tablet, Rfl: 1    simvastatin (ZOCOR) 20 MG tablet, Take 1 tablet by mouth nightly, Disp: 90 tablet, Rfl: 1    hydroCHLOROthiazide (HYDRODIURIL) 25 MG tablet, Take 1 tablet by mouth in the morning., Disp: 90 tablet, Rfl: 1    escitalopram (LEXAPRO) 5 MG tablet, TAKE 1 TABLET BY MOUTH EVERY DAY, Disp: 90 tablet, Rfl: 1     Past Medical History:   Diagnosis Date    Alzheimer's disease ([facility] [address]     Anxiety     Depression     Hypercholesterolemia     Hypertension     Hypothyroidism     Sleep disorder        Karuna Boyer was seen today for thyroid problem. Diagnoses and all orders for this visit:    Hypertension, unspecified type  -     CBC with Auto Differential; Future  -     Comprehensive Metabolic Panel; Future  -     TSH; Future  -     T4, Free; Future  -     Vitamin B12; Future    Hypothyroidism, unspecified type  -     CBC with Auto Differential; Future  -     Comprehensive Metabolic Panel; Future  -     TSH; Future  -     T4, Free; Future  -     Vitamin B12; Future    Anxiety and depression  -     CBC with Auto Differential; Future  -     Comprehensive Metabolic Panel; Future  -     TSH; Future  -     T4, Free; Future  -     Vitamin B12; Future    Sinus congestion  -     amoxicillin-clavulanate (AUGMENTIN) 875-125 MG per tablet; Take 1 tablet by mouth 2 times daily for 10 days  -     fexofenadine (ALLEGRA) 180 MG tablet;  Take 1 tablet by mouth daily  -     fluticasone (FLONASE) 50 MCG/ACT nasal spray; 2 sprays by Each Nostril route daily  -     CBC with Auto Differential; Future     Meds above  Labs today  Too late in illness to test    Horacio Mohr MD

## 2022-10-18 DIAGNOSIS — E03.9 HYPOTHYROIDISM, UNSPECIFIED TYPE: Primary | ICD-10-CM

## 2022-10-18 RX ORDER — LEVOTHYROXINE SODIUM 0.1 MG/1
100 TABLET ORAL DAILY
Qty: 30 TABLET | Refills: 1 | Status: SHIPPED | OUTPATIENT
Start: 2022-10-18

## 2022-11-01 DIAGNOSIS — E03.9 HYPOTHYROIDISM, UNSPECIFIED TYPE: ICD-10-CM

## 2022-11-01 RX ORDER — LEVOTHYROXINE SODIUM 0.12 MG/1
TABLET ORAL
Qty: 30 TABLET | Refills: 0 | OUTPATIENT
Start: 2022-11-01

## 2022-11-28 ENCOUNTER — TELEPHONE (OUTPATIENT)
Dept: PHARMACY | Facility: CLINIC | Age: 77
End: 2022-11-28

## 2022-11-28 NOTE — TELEPHONE ENCOUNTER
POPULATION HEALTH CLINICAL PHARMACY: ADHERENCE REVIEW  Identified care gap per [place]: fills at [facility]: ACE/ARB and Statin adherence    Last Visit: 10/10/22    ASSESSMENT  ACE/ARB ADHERENCE    Insurance Records claims through 11.17.22 [facility] [place] =  93%; Passed in 2022): Lisinopril 40mg. Next refill due: 12/1/22    Per [facility]:   Staff will refill    BP Readings from Last 3 Encounters:   10/10/22 132/70   08/04/22 124/68   02/24/22 126/70     Estimated Creatinine Clearance: 53 mL/min (based on SCr of 0.8 mg/dL). [address] Records claims through 11.17.22  [facility] [place] =  77%; Potential Fail Date: 12/2/22 ):   Simvastatin 20mg Next refill due: 12/1/22    Per [facility]:   Staff will refill    Lab Results   Component Value Date    CHOL 153 09/30/2021    TRIG 142 09/30/2021    HDL 48 09/30/2021    LDLCALC 77 09/30/2021     ALT   Date Value Ref Range Status   10/10/2022 13 0 - 32 U/L Final     AST   Date Value Ref Range Status   10/10/2022 23 0 - 31 U/L Final     The 10-year ASCVD risk score (Naren DK, et al., 2019) is: 24.1%    Values used to calculate the score:      Age: 68 years      Sex: Female      Is Non- : No      Diabetic: No      Tobacco smoker: No      Systolic Blood Pressure: 585 mmHg      Is BP treated: Yes      HDL Cholesterol: 48 mg/dL      Total Cholesterol: 153 mg/dL     PLAN  The following are interventions that have been identified:   Lisinopril & simvastatin both due now with fail date approaching. Staff will refill both    No future appointments. Lucy Garsia CPhT.    [address] [facility] free: [address] [facility] in place:  No  Recommendation Provided To: Pharmacy: 2  Intervention Detail: Refill(s) Provided  Gap Closed?: No   Intervention Accepted By: Pharmacy: 2  Time Spent (min): 15

## 2022-12-06 DIAGNOSIS — E03.9 HYPOTHYROIDISM, UNSPECIFIED TYPE: ICD-10-CM

## 2022-12-07 LAB
T4 FREE: 1.57 NG/DL (ref 0.93–1.7)
TSH SERPL DL<=0.05 MIU/L-ACNC: 0.02 UIU/ML (ref 0.27–4.2)

## 2022-12-08 DIAGNOSIS — E03.9 HYPOTHYROIDISM, UNSPECIFIED TYPE: ICD-10-CM

## 2022-12-08 RX ORDER — LEVOTHYROXINE SODIUM 88 UG/1
88 TABLET ORAL DAILY
Qty: 30 TABLET | Refills: 1 | Status: SHIPPED | OUTPATIENT
Start: 2022-12-08

## 2022-12-11 DIAGNOSIS — E03.9 HYPOTHYROIDISM, UNSPECIFIED TYPE: Primary | ICD-10-CM

## 2022-12-12 ENCOUNTER — TELEPHONE (OUTPATIENT)
Dept: FAMILY MEDICINE CLINIC | Age: 77
End: 2022-12-12

## 2022-12-12 DIAGNOSIS — E03.9 HYPOTHYROIDISM, UNSPECIFIED TYPE: ICD-10-CM

## 2022-12-12 DIAGNOSIS — I10 HYPERTENSION, UNSPECIFIED TYPE: Primary | ICD-10-CM

## 2022-12-12 LAB
T4 FREE: 1.66 NG/DL (ref 0.93–1.7)
TSH SERPL DL<=0.05 MIU/L-ACNC: 0.02 UIU/ML (ref 0.27–4.2)

## 2022-12-12 RX ORDER — LISINOPRIL 40 MG/1
TABLET ORAL
Qty: 90 TABLET | Refills: 1 | Status: SHIPPED | OUTPATIENT
Start: 2022-12-12

## 2022-12-12 NOTE — TELEPHONE ENCOUNTER
Rui Hurst came into the office and asked for a refill of the lisinopril 40MG tablet to [facility]. The best call back number is [phone].

## 2022-12-14 RX ORDER — LEVOTHYROXINE SODIUM 0.1 MG/1
TABLET ORAL
Qty: 30 TABLET | Refills: 1 | OUTPATIENT
Start: 2022-12-14

## 2022-12-20 DIAGNOSIS — E03.9 HYPOTHYROIDISM, UNSPECIFIED TYPE: ICD-10-CM

## 2022-12-20 RX ORDER — LEVOTHYROXINE SODIUM 0.12 MG/1
TABLET ORAL
Qty: 30 TABLET | Refills: 0 | OUTPATIENT
Start: 2022-12-20

## 2023-02-02 ENCOUNTER — PATIENT MESSAGE (OUTPATIENT)
Dept: FAMILY MEDICINE CLINIC | Age: 78
End: 2023-02-02

## 2023-02-02 NOTE — TELEPHONE ENCOUNTER
Patient had wanted to be seen thru express care to have bilateral ears checked. Her sister was here seeing her PCP and PCP did not have any openings for patient to be seen advised express care.  Nurse went out to get patient and was told my  Maria G Dawkins) that she had just left and stated that ' her ears can be checked a different time she wanted to go home because she was not feeling well. '

## 2023-02-10 DIAGNOSIS — E03.9 HYPOTHYROIDISM, UNSPECIFIED TYPE: ICD-10-CM

## 2023-02-14 RX ORDER — LEVOTHYROXINE SODIUM 88 UG/1
TABLET ORAL
Qty: 30 TABLET | Refills: 1 | OUTPATIENT
Start: 2023-02-14

## 2023-02-20 DIAGNOSIS — E03.9 HYPOTHYROIDISM, UNSPECIFIED TYPE: ICD-10-CM

## 2023-02-20 RX ORDER — LEVOTHYROXINE SODIUM 88 UG/1
88 TABLET ORAL DAILY
Qty: 90 TABLET | Refills: 1 | Status: SHIPPED | OUTPATIENT
Start: 2023-02-20

## 2023-02-20 NOTE — TELEPHONE ENCOUNTER
Sagar Herrera calling for a new script for Levothyroxine sent [facility]. Last Appointment:  10/10/2022  No future appointments.

## 2023-04-20 ENCOUNTER — OFFICE VISIT (OUTPATIENT)
Dept: FAMILY MEDICINE CLINIC | Age: 78
End: 2023-04-20

## 2023-04-20 VITALS
HEART RATE: 61 BPM | OXYGEN SATURATION: 97 % | BODY MASS INDEX: 30.78 KG/M2 | DIASTOLIC BLOOD PRESSURE: 70 MMHG | RESPIRATION RATE: 15 BRPM | TEMPERATURE: 97.7 F | SYSTOLIC BLOOD PRESSURE: 126 MMHG | WEIGHT: 163 LBS | HEIGHT: 61 IN

## 2023-04-20 DIAGNOSIS — E03.9 HYPOTHYROIDISM, UNSPECIFIED TYPE: ICD-10-CM

## 2023-04-20 DIAGNOSIS — H91.93 BILATERAL HEARING LOSS, UNSPECIFIED HEARING LOSS TYPE: ICD-10-CM

## 2023-04-20 DIAGNOSIS — J45.998 CHRONIC ALLERGIC BRONCHITIS: ICD-10-CM

## 2023-04-20 DIAGNOSIS — Z00.00 MEDICARE ANNUAL WELLNESS VISIT, SUBSEQUENT: Primary | ICD-10-CM

## 2023-04-20 LAB
T4 FREE SERPL-MCNC: 1.22 NG/DL (ref 0.93–1.7)
TSH SERPL-MCNC: 0.07 UIU/ML (ref 0.27–4.2)

## 2023-04-20 RX ORDER — MONTELUKAST SODIUM 10 MG/1
10 TABLET ORAL DAILY
Qty: 30 TABLET | Refills: 5 | Status: SHIPPED | OUTPATIENT
Start: 2023-04-20

## 2023-04-20 ASSESSMENT — PATIENT HEALTH QUESTIONNAIRE - PHQ9
6. FEELING BAD ABOUT YOURSELF - OR THAT YOU ARE A FAILURE OR HAVE LET YOURSELF OR YOUR FAMILY DOWN: 1
7. TROUBLE CONCENTRATING ON THINGS, SUCH AS READING THE NEWSPAPER OR WATCHING TELEVISION: 1
9. THOUGHTS THAT YOU WOULD BE BETTER OFF DEAD, OR OF HURTING YOURSELF: 0
4. FEELING TIRED OR HAVING LITTLE ENERGY: 3
3. TROUBLE FALLING OR STAYING ASLEEP: 3
5. POOR APPETITE OR OVEREATING: 1
SUM OF ALL RESPONSES TO PHQ QUESTIONS 1-9: 12
1. LITTLE INTEREST OR PLEASURE IN DOING THINGS: 0
8. MOVING OR SPEAKING SO SLOWLY THAT OTHER PEOPLE COULD HAVE NOTICED. OR THE OPPOSITE, BEING SO FIGETY OR RESTLESS THAT YOU HAVE BEEN MOVING AROUND A LOT MORE THAN USUAL: 0
SUM OF ALL RESPONSES TO PHQ QUESTIONS 1-9: 12
SUM OF ALL RESPONSES TO PHQ QUESTIONS 1-9: 12
SUM OF ALL RESPONSES TO PHQ9 QUESTIONS 1 & 2: 3
2. FEELING DOWN, DEPRESSED OR HOPELESS: 3
SUM OF ALL RESPONSES TO PHQ QUESTIONS 1-9: 12
10. IF YOU CHECKED OFF ANY PROBLEMS, HOW DIFFICULT HAVE THESE PROBLEMS MADE IT FOR YOU TO DO YOUR WORK, TAKE CARE OF THINGS AT HOME, OR GET ALONG WITH OTHER PEOPLE: 1

## 2023-04-20 ASSESSMENT — LIFESTYLE VARIABLES
HOW MANY STANDARD DRINKS CONTAINING ALCOHOL DO YOU HAVE ON A TYPICAL DAY: PATIENT DOES NOT DRINK
HOW OFTEN DO YOU HAVE A DRINK CONTAINING ALCOHOL: NEVER

## 2023-04-20 NOTE — PROGRESS NOTES
movement, no respiratory distress  Cardiovascular: normal rate and normal S1 and S2       Allergies   Allergen Reactions    Seasonal      Prior to Visit Medications    Medication Sig Taking? Authorizing Provider   montelukast (SINGULAIR) 10 MG tablet Take 1 tablet by mouth daily Yes David Artis MD   fexofenadine (ALLEGRA) 180 MG tablet TAKE 1 TABLET BY MOUTH EVERY DAY Yes David Artis MD   levothyroxine (SYNTHROID) 88 MCG tablet Take 1 tablet by mouth daily Yes David Artis MD   lisinopril (PRINIVIL;ZESTRIL) 40 MG tablet TAKE 1 TABLET BY MOUTH EVERY DAY Yes David Artis MD   fluticasone (FLONASE) 50 MCG/ACT nasal spray 2 sprays by Each Nostril route daily Yes David Artis MD   simvastatin (ZOCOR) 20 MG tablet Take 1 tablet by mouth nightly Yes David Artis MD   hydroCHLOROthiazide (HYDRODIURIL) 25 MG tablet Take 1 tablet by mouth in the morning.  Yes David Artis MD   escitalopram (LEXAPRO) 5 MG tablet TAKE 1 TABLET BY MOUTH [place] Yes MD Almas Reardon (Including outside providers/suppliers regularly involved in providing care):   Patient Care Team:  David Artis MD as PCP - General (Family Medicine)  David Artis MD as PCP - Empaneled Provider     Reviewed and updated this visit:  Tobacco  Allergies  Meds  Med Hx  Surg Hx  Soc Hx  Fam Hx             Stacy Al MD

## 2023-04-20 NOTE — PATIENT INSTRUCTIONS
lifestyle, illnesses that may run in your family, and various assessments and screenings as appropriate. After reviewing your medical record and screening and assessments performed today your provider may have ordered immunizations, labs, imaging, and/or referrals for you. A list of these orders (if applicable) as well as your Preventive Care list are included within your After Visit Summary for your review. Other Preventive Recommendations:    A preventive eye exam performed by an eye specialist is recommended every 1-2 years to screen for glaucoma; cataracts, macular degeneration, and other eye disorders. A preventive dental visit is recommended every 6 months. Try to get at least 150 minutes of exercise per week or 10,000 steps per day on a pedometer . Order or download the FREE \"Exercise & Physical Activity: Your Everyday Guide\" from [employer]. Call 3-667.539.6695 or search [employer] online. You need 9067-2930 mg of calcium and 3590-1626 IU of vitamin D per day. It is possible to meet your calcium requirement with diet alone, but a vitamin D supplement is usually necessary to meet this goal.  When exposed to the sun, use a sunscreen that protects against both UVA and UVB radiation with an SPF of 30 or greater. Reapply every 2 to 3 hours or after sweating, drying off with a towel, or swimming. Always wear a seat belt when traveling in a car. Always wear a helmet when riding a bicycle or motorcycle.

## 2023-04-21 DIAGNOSIS — E03.9 HYPOTHYROIDISM, UNSPECIFIED TYPE: ICD-10-CM

## 2023-04-21 RX ORDER — LEVOTHYROXINE SODIUM 0.05 MG/1
50 TABLET ORAL DAILY
Qty: 30 TABLET | Refills: 1 | Status: SHIPPED | OUTPATIENT
Start: 2023-04-21

## 2023-06-14 DIAGNOSIS — E03.9 HYPOTHYROIDISM, UNSPECIFIED TYPE: ICD-10-CM

## 2023-06-14 LAB
T4 FREE SERPL-MCNC: 0.7 NG/DL (ref 0.93–1.7)
TSH SERPL-MCNC: 2.48 UIU/ML (ref 0.27–4.2)

## 2023-06-25 DIAGNOSIS — E03.9 HYPOTHYROIDISM, UNSPECIFIED TYPE: ICD-10-CM

## 2023-06-26 DIAGNOSIS — E03.9 HYPOTHYROIDISM, UNSPECIFIED TYPE: ICD-10-CM

## 2023-06-26 RX ORDER — LEVOTHYROXINE SODIUM 0.05 MG/1
TABLET ORAL
Qty: 30 TABLET | Refills: 1 | OUTPATIENT
Start: 2023-06-26

## 2023-06-26 RX ORDER — LEVOTHYROXINE SODIUM 0.05 MG/1
50 TABLET ORAL DAILY
Qty: 30 TABLET | Refills: 1 | Status: SHIPPED | OUTPATIENT
Start: 2023-06-26

## 2023-07-21 DIAGNOSIS — R09.81 SINUS CONGESTION: ICD-10-CM

## 2023-07-24 RX ORDER — FEXOFENADINE HCL 180 MG/1
TABLET ORAL
Qty: 90 TABLET | Refills: 0 | OUTPATIENT
Start: 2023-07-24

## 2023-08-26 DIAGNOSIS — E03.9 HYPOTHYROIDISM, UNSPECIFIED TYPE: ICD-10-CM

## 2023-08-28 RX ORDER — LEVOTHYROXINE SODIUM 0.05 MG/1
TABLET ORAL
Qty: 30 TABLET | Refills: 1 | OUTPATIENT
Start: 2023-08-28

## 2023-09-05 DIAGNOSIS — E03.9 HYPOTHYROIDISM, UNSPECIFIED TYPE: ICD-10-CM

## 2023-09-05 RX ORDER — LEVOTHYROXINE SODIUM 0.05 MG/1
50 TABLET ORAL DAILY
Qty: 30 TABLET | Refills: 1 | Status: SHIPPED | OUTPATIENT
Start: 2023-09-05

## 2023-09-05 NOTE — TELEPHONE ENCOUNTER
Population Health 's unsuccessful call    Social Work note:     reviewed chart and attempted to reach patient.  No voicemail.    's next contact:  1-2 weeks  or upon returned call from patient   Last Appointment:  4/20/2023  Future Appointments   Date Time Provider [address]   10/24/2023  1:15 PM Chester Graf  [facility] [place]

## 2023-09-21 DIAGNOSIS — I10 HYPERTENSION, UNSPECIFIED TYPE: ICD-10-CM

## 2023-09-21 RX ORDER — LISINOPRIL 40 MG/1
TABLET ORAL
Qty: 90 TABLET | Refills: 1 | Status: SHIPPED | OUTPATIENT
Start: 2023-09-21

## 2023-09-21 NOTE — TELEPHONE ENCOUNTER
Hossein Bermudez needs a new [facility].        Last Appointment:  4/20/2023  Future Appointments   Date Time Provider [address]   10/24/2023  1:15 PM Wagner Will  [facility]

## 2023-10-24 ENCOUNTER — OFFICE VISIT (OUTPATIENT)
Dept: FAMILY MEDICINE CLINIC | Age: 78
End: 2023-10-24
Payer: MEDICARE

## 2023-10-24 VITALS
OXYGEN SATURATION: 98 % | DIASTOLIC BLOOD PRESSURE: 80 MMHG | RESPIRATION RATE: 16 BRPM | HEART RATE: 72 BPM | WEIGHT: 170 LBS | BODY MASS INDEX: 32.1 KG/M2 | HEIGHT: 61 IN | TEMPERATURE: 97.8 F | SYSTOLIC BLOOD PRESSURE: 128 MMHG

## 2023-10-24 DIAGNOSIS — I10 HYPERTENSION, UNSPECIFIED TYPE: ICD-10-CM

## 2023-10-24 DIAGNOSIS — Z23 NEED FOR VACCINATION: ICD-10-CM

## 2023-10-24 DIAGNOSIS — F41.9 ANXIETY AND DEPRESSION: Primary | ICD-10-CM

## 2023-10-24 DIAGNOSIS — F32.A ANXIETY AND DEPRESSION: Primary | ICD-10-CM

## 2023-10-24 DIAGNOSIS — E03.9 HYPOTHYROIDISM, UNSPECIFIED TYPE: ICD-10-CM

## 2023-10-24 DIAGNOSIS — Z00.00 MEDICARE ANNUAL WELLNESS VISIT, SUBSEQUENT: ICD-10-CM

## 2023-10-24 DIAGNOSIS — J45.998 CHRONIC ALLERGIC BRONCHITIS: ICD-10-CM

## 2023-10-24 PROCEDURE — 3074F SYST BP LT 130 MM HG: CPT | Performed by: FAMILY MEDICINE

## 2023-10-24 PROCEDURE — 90694 VACC AIIV4 NO PRSRV 0.5ML IM: CPT | Performed by: FAMILY MEDICINE

## 2023-10-24 PROCEDURE — 99214 OFFICE O/P EST MOD 30 MIN: CPT | Performed by: FAMILY MEDICINE

## 2023-10-24 PROCEDURE — G0008 ADMIN INFLUENZA VIRUS VAC: HCPCS | Performed by: FAMILY MEDICINE

## 2023-10-24 PROCEDURE — 1123F ACP DISCUSS/DSCN MKR DOCD: CPT | Performed by: FAMILY MEDICINE

## 2023-10-24 PROCEDURE — 3079F DIAST BP 80-89 MM HG: CPT | Performed by: FAMILY MEDICINE

## 2023-10-24 RX ORDER — ESCITALOPRAM OXALATE 5 MG/1
TABLET ORAL
Qty: 90 TABLET | Refills: 1 | Status: SHIPPED | OUTPATIENT
Start: 2023-10-24

## 2023-10-24 RX ORDER — HYDROCHLOROTHIAZIDE 25 MG/1
25 TABLET ORAL DAILY
Qty: 90 TABLET | Refills: 1 | Status: SHIPPED | OUTPATIENT
Start: 2023-10-24

## 2023-10-24 RX ORDER — LISINOPRIL 40 MG/1
TABLET ORAL
Qty: 90 TABLET | Refills: 1 | Status: SHIPPED | OUTPATIENT
Start: 2023-10-24

## 2023-10-24 RX ORDER — MONTELUKAST SODIUM 10 MG/1
10 TABLET ORAL DAILY
Qty: 90 TABLET | Refills: 1 | Status: SHIPPED | OUTPATIENT
Start: 2023-10-24

## 2023-10-24 RX ORDER — LEVOTHYROXINE SODIUM 0.05 MG/1
50 TABLET ORAL DAILY
Qty: 90 TABLET | Refills: 1 | Status: SHIPPED | OUTPATIENT
Start: 2023-10-24

## 2023-10-24 SDOH — ECONOMIC STABILITY: FOOD INSECURITY: WITHIN THE PAST 12 MONTHS, YOU WORRIED THAT YOUR FOOD WOULD RUN OUT BEFORE YOU GOT MONEY TO BUY MORE.: NEVER TRUE

## 2023-10-24 SDOH — ECONOMIC STABILITY: INCOME INSECURITY: HOW HARD IS IT FOR YOU TO PAY FOR THE VERY BASICS LIKE FOOD, HOUSING, MEDICAL CARE, AND HEATING?: NOT HARD AT ALL

## 2023-10-24 SDOH — ECONOMIC STABILITY: HOUSING INSECURITY
IN THE LAST 12 MONTHS, WAS THERE A TIME WHEN YOU DID NOT HAVE A STEADY PLACE TO SLEEP OR SLEPT IN A SHELTER (INCLUDING NOW)?: NO

## 2023-10-24 SDOH — ECONOMIC STABILITY: FOOD INSECURITY: WITHIN THE PAST 12 MONTHS, THE FOOD YOU BOUGHT JUST DIDN'T LAST AND YOU DIDN'T HAVE MONEY TO GET MORE.: NEVER TRUE

## 2023-11-02 DIAGNOSIS — J45.998 CHRONIC ALLERGIC BRONCHITIS: ICD-10-CM

## 2023-11-02 DIAGNOSIS — E03.9 HYPOTHYROIDISM, UNSPECIFIED TYPE: ICD-10-CM

## 2023-11-03 RX ORDER — MONTELUKAST SODIUM 10 MG/1
10 TABLET ORAL DAILY
Qty: 30 TABLET | Refills: 5 | OUTPATIENT
Start: 2023-11-03

## 2023-11-03 RX ORDER — LEVOTHYROXINE SODIUM 0.05 MG/1
50 TABLET ORAL DAILY
Qty: 30 TABLET | Refills: 1 | OUTPATIENT
Start: 2023-11-03

## 2023-12-07 DIAGNOSIS — I10 HYPERTENSION, UNSPECIFIED TYPE: ICD-10-CM

## 2023-12-08 RX ORDER — LISINOPRIL 40 MG/1
TABLET ORAL
Qty: 90 TABLET | Refills: 1 | Status: SHIPPED | OUTPATIENT
Start: 2023-12-08

## 2024-01-03 RX ORDER — SIMVASTATIN 20 MG
20 TABLET ORAL NIGHTLY
Qty: 90 TABLET | Refills: 1 | Status: SHIPPED | OUTPATIENT
Start: 2024-01-03

## 2024-04-11 DIAGNOSIS — I10 HYPERTENSION, UNSPECIFIED TYPE: ICD-10-CM

## 2024-04-11 RX ORDER — LISINOPRIL 40 MG/1
40 TABLET ORAL DAILY
Qty: 90 TABLET | Refills: 1 | Status: SHIPPED | OUTPATIENT
Start: 2024-04-11

## 2024-05-06 DIAGNOSIS — E03.9 HYPOTHYROIDISM, UNSPECIFIED TYPE: ICD-10-CM

## 2024-05-06 RX ORDER — LEVOTHYROXINE SODIUM 0.05 MG/1
50 TABLET ORAL DAILY
Qty: 90 TABLET | Refills: 3 | Status: SHIPPED | OUTPATIENT
Start: 2024-05-06

## 2024-05-06 NOTE — TELEPHONE ENCOUNTER
Last Appointment:  10/24/2023  Future Appointments   Date Time Provider Department Center   5/8/2024  1:30 PM Kristin Walker MD [facility]

## 2024-05-08 ENCOUNTER — OFFICE VISIT (OUTPATIENT)
Dept: FAMILY MEDICINE CLINIC | Age: 79
End: 2024-05-08
Payer: MEDICARE

## 2024-05-08 VITALS
SYSTOLIC BLOOD PRESSURE: 122 MMHG | TEMPERATURE: 97.3 F | RESPIRATION RATE: 15 BRPM | DIASTOLIC BLOOD PRESSURE: 86 MMHG | OXYGEN SATURATION: 96 % | WEIGHT: 168 LBS | HEART RATE: 62 BPM | BODY MASS INDEX: 27.99 KG/M2 | HEIGHT: 65 IN

## 2024-05-08 DIAGNOSIS — E03.9 HYPOTHYROIDISM, UNSPECIFIED TYPE: ICD-10-CM

## 2024-05-08 DIAGNOSIS — H61.23 BILATERAL IMPACTED CERUMEN: ICD-10-CM

## 2024-05-08 DIAGNOSIS — Z00.00 MEDICARE ANNUAL WELLNESS VISIT, SUBSEQUENT: ICD-10-CM

## 2024-05-08 DIAGNOSIS — I10 HYPERTENSION, UNSPECIFIED TYPE: ICD-10-CM

## 2024-05-08 DIAGNOSIS — Z00.00 MEDICARE ANNUAL WELLNESS VISIT, SUBSEQUENT: Primary | ICD-10-CM

## 2024-05-08 DIAGNOSIS — J45.998 CHRONIC ALLERGIC BRONCHITIS: ICD-10-CM

## 2024-05-08 DIAGNOSIS — F41.9 ANXIETY AND DEPRESSION: ICD-10-CM

## 2024-05-08 DIAGNOSIS — F32.A ANXIETY AND DEPRESSION: ICD-10-CM

## 2024-05-08 LAB
ALBUMIN: 4.4 G/DL (ref 3.5–5.2)
ALP BLD-CCNC: 51 U/L (ref 35–104)
ALT SERPL-CCNC: 15 U/L (ref 0–32)
ANION GAP SERPL CALCULATED.3IONS-SCNC: 15 MMOL/L (ref 7–16)
AST SERPL-CCNC: 28 U/L (ref 0–31)
BASOPHILS ABSOLUTE: 0.05 K/UL (ref 0–0.2)
BASOPHILS RELATIVE PERCENT: 1 % (ref 0–2)
BILIRUB SERPL-MCNC: 0.6 MG/DL (ref 0–1.2)
BUN BLDV-MCNC: 21 MG/DL (ref 6–23)
CALCIUM SERPL-MCNC: 9.7 MG/DL (ref 8.6–10.2)
CHLORIDE BLD-SCNC: 108 MMOL/L (ref 98–107)
CHOLESTEROL, TOTAL: 196 MG/DL
CO2: 20 MMOL/L (ref 22–29)
CREAT SERPL-MCNC: 1 MG/DL (ref 0.5–1)
EOSINOPHILS ABSOLUTE: 0.15 K/UL (ref 0.05–0.5)
EOSINOPHILS RELATIVE PERCENT: 3 % (ref 0–6)
GFR, ESTIMATED: 57 ML/MIN/1.73M2
GLUCOSE BLD-MCNC: 97 MG/DL (ref 74–99)
HCT VFR BLD CALC: 49.3 % (ref 34–48)
HDLC SERPL-MCNC: 41 MG/DL
HEMOGLOBIN: 15.7 G/DL (ref 11.5–15.5)
IMMATURE GRANULOCYTES %: 0 % (ref 0–5)
IMMATURE GRANULOCYTES ABSOLUTE: <0.03 K/UL (ref 0–0.58)
LDL CHOLESTEROL: 108 MG/DL
LYMPHOCYTES ABSOLUTE: 1.42 K/UL (ref 1.5–4)
LYMPHOCYTES RELATIVE PERCENT: 32 % (ref 20–42)
MCH RBC QN AUTO: 31.3 PG (ref 26–35)
MCHC RBC AUTO-ENTMCNC: 31.8 G/DL (ref 32–34.5)
MCV RBC AUTO: 98.2 FL (ref 80–99.9)
MONOCYTES ABSOLUTE: 0.44 K/UL (ref 0.1–0.95)
MONOCYTES RELATIVE PERCENT: 10 % (ref 2–12)
NEUTROPHILS ABSOLUTE: 2.34 K/UL (ref 1.8–7.3)
NEUTROPHILS RELATIVE PERCENT: 53 % (ref 43–80)
PDW BLD-RTO: 14.2 % (ref 11.5–15)
PLATELET # BLD: 166 K/UL (ref 130–450)
PMV BLD AUTO: 10.9 FL (ref 7–12)
POTASSIUM SERPL-SCNC: 4.3 MMOL/L (ref 3.5–5)
RBC # BLD: 5.02 M/UL (ref 3.5–5.5)
SODIUM BLD-SCNC: 143 MMOL/L (ref 132–146)
T4 FREE: 0.8 NG/DL (ref 0.9–1.7)
TOTAL PROTEIN: 7.3 G/DL (ref 6.4–8.3)
TRIGL SERPL-MCNC: 235 MG/DL
TSH SERPL DL<=0.05 MIU/L-ACNC: 8.57 UIU/ML (ref 0.27–4.2)
VLDLC SERPL CALC-MCNC: 47 MG/DL
WBC # BLD: 4.4 K/UL (ref 4.5–11.5)

## 2024-05-08 PROCEDURE — 3079F DIAST BP 80-89 MM HG: CPT | Performed by: FAMILY MEDICINE

## 2024-05-08 PROCEDURE — 3074F SYST BP LT 130 MM HG: CPT | Performed by: FAMILY MEDICINE

## 2024-05-08 PROCEDURE — G0439 PPPS, SUBSEQ VISIT: HCPCS | Performed by: FAMILY MEDICINE

## 2024-05-08 PROCEDURE — 1123F ACP DISCUSS/DSCN MKR DOCD: CPT | Performed by: FAMILY MEDICINE

## 2024-05-08 RX ORDER — ESCITALOPRAM OXALATE 5 MG/1
TABLET ORAL
Qty: 90 TABLET | Refills: 1 | Status: SHIPPED | OUTPATIENT
Start: 2024-05-08

## 2024-05-08 RX ORDER — SIMVASTATIN 20 MG
20 TABLET ORAL NIGHTLY
Qty: 90 TABLET | Refills: 1 | Status: SHIPPED | OUTPATIENT
Start: 2024-05-08

## 2024-05-08 RX ORDER — MONTELUKAST SODIUM 10 MG/1
10 TABLET ORAL DAILY
Qty: 90 TABLET | Refills: 1 | Status: SHIPPED | OUTPATIENT
Start: 2024-05-08

## 2024-05-08 ASSESSMENT — PATIENT HEALTH QUESTIONNAIRE - PHQ9
SUM OF ALL RESPONSES TO PHQ QUESTIONS 1-9: 9
SUM OF ALL RESPONSES TO PHQ9 QUESTIONS 1 & 2: 3
4. FEELING TIRED OR HAVING LITTLE ENERGY: NEARLY EVERY DAY
2. FEELING DOWN, DEPRESSED OR HOPELESS: SEVERAL DAYS
7. TROUBLE CONCENTRATING ON THINGS, SUCH AS READING THE NEWSPAPER OR WATCHING TELEVISION: SEVERAL DAYS
5. POOR APPETITE OR OVEREATING: NOT AT ALL
8. MOVING OR SPEAKING SO SLOWLY THAT OTHER PEOPLE COULD HAVE NOTICED. OR THE OPPOSITE, BEING SO FIGETY OR RESTLESS THAT YOU HAVE BEEN MOVING AROUND A LOT MORE THAN USUAL: NOT AT ALL
SUM OF ALL RESPONSES TO PHQ QUESTIONS 1-9: 9
6. FEELING BAD ABOUT YOURSELF - OR THAT YOU ARE A FAILURE OR HAVE LET YOURSELF OR YOUR FAMILY DOWN: NOT AT ALL
SUM OF ALL RESPONSES TO PHQ QUESTIONS 1-9: 9
10. IF YOU CHECKED OFF ANY PROBLEMS, HOW DIFFICULT HAVE THESE PROBLEMS MADE IT FOR YOU TO DO YOUR WORK, TAKE CARE OF THINGS AT HOME, OR GET ALONG WITH OTHER PEOPLE: SOMEWHAT DIFFICULT
1. LITTLE INTEREST OR PLEASURE IN DOING THINGS: MORE THAN HALF THE DAYS
SUM OF ALL RESPONSES TO PHQ QUESTIONS 1-9: 9
9. THOUGHTS THAT YOU WOULD BE BETTER OFF DEAD, OR OF HURTING YOURSELF: NOT AT ALL
3. TROUBLE FALLING OR STAYING ASLEEP: MORE THAN HALF THE DAYS

## 2024-05-08 NOTE — PROGRESS NOTES
and treatment          General HRA Questions:  Select all that apply: (!) New or Increased Fatigue    Fatigue Interventions:  Patient advised to follow up in the office for further evaluation and treatment      Activity, Diet, and Weight:  On average, how many days per week do you engage in moderate to strenuous exercise (like a brisk walk)?: 0 days  On average, how many minutes do you engage in exercise at this level?: 0 min    Do you eat balanced/healthy meals regularly?: (!) No    Body mass index is 27.96 kg/m².      Inactivity Interventions:  Patient advised to follow up in the office for further evaluation and treatment  Do you eat balanced/healthy meals regularly Interventions:  Patient advised to follow-up in this office for further evaluation and treatment        Dentist Screen:  Have you seen the dentist within the past year?: (!) No    Intervention:  Advised to schedule with their dentist    Hearing Screen:  Do you or your family notice any trouble with your hearing that hasn't been managed with hearing aids?: (!) Yes    Interventions:  See AVS for additional education material    Vision Screen:  Do you have difficulty driving, watching TV, or doing any of your daily activities because of your eyesight?: (!) Yes  Have you had an eye exam within the past year?: (!) No  No results found.    Interventions:   Patient encouraged to make appointment with their eye specialist    Safety:  Do you have working smoke detectors?: (!) No  Do you have any tripping hazards - loose or unsecured carpets or rugs?: (!) Yes  Do you have non-slip mats or non-slip surfaces or shower bars or grab bars in your shower or bathtub?: (!) No  Do you always fasten your seatbelt when you are in a car?: (!) No  Interventions:  See AVS for additional education material               Objective   Vitals:    05/08/24 1320   BP: 122/86   Pulse: 62   Resp: 15   Temp: 97.3 °F (36.3 °C)   TempSrc: Temporal   SpO2: 96%   Weight: 76.2 kg (168 lb)

## 2024-05-08 NOTE — PATIENT INSTRUCTIONS
doctor.   Medicines    Take your medicines exactly as prescribed. Call your doctor if you think you are having a problem with your medicine.     If your doctor recommends aspirin, take the amount directed each day. Make sure you take aspirin and not another kind of pain reliever, such as acetaminophen (Tylenol).   When should you call for help?   Call 911 if you have symptoms of a heart attack. These may include:    Chest pain or pressure, or a strange feeling in the chest.     Sweating.     Shortness of breath.     Pain, pressure, or a strange feeling in the back, neck, jaw, or upper belly or in one or both shoulders or arms.     Lightheadedness or sudden weakness.     A fast or irregular heartbeat.   After you call 911, the  may tell you to chew 1 adult-strength or 2 to 4 low-dose aspirin. Wait for an ambulance. Do not try to drive yourself.  Watch closely for changes in your health, and be sure to contact your doctor if you have any problems.  Where can you learn more?  Go to https://www.[employer].net/patientEd and enter F075 to learn more about \"A Healthy Heart: Care Instructions.\"  Current as of: June 24, 2023               Content Version: 14.0  © 9200-2436 "[employer].   Care instructions adapted under license by [employer]. If you have questions about a medical condition or this instruction, always ask your healthcare professional. "[employer] disclaims any warranty or liability for your use of this information.      Personalized Preventive Plan for Litzy Jesus - 5/8/2024  Medicare offers a range of preventive health benefits. Some of the tests and screenings are paid in full while other may be subject to a deductible, co-insurance, and/or copay.    Some of these benefits include a comprehensive review of your medical history including lifestyle, illnesses that may run in your family, and various assessments and screenings as appropriate.    After reviewing your  ferrous sulfate (as elemental iron) 15 mg/mL oral liquid: 0.3 milliliter(s) orally once a day MDD: 0.3ml  Multiple Vitamins oral liquid: 1 milliliter(s) orally every 24 hours MDD: 1ml

## 2024-05-13 DIAGNOSIS — E03.9 HYPOTHYROIDISM, UNSPECIFIED TYPE: Primary | ICD-10-CM

## 2024-07-22 DIAGNOSIS — E03.9 HYPOTHYROIDISM, UNSPECIFIED TYPE: ICD-10-CM

## 2024-07-22 LAB
T4 FREE: 0.5 NG/DL (ref 0.9–1.7)
TSH SERPL DL<=0.05 MIU/L-ACNC: 16.78 UIU/ML (ref 0.27–4.2)

## 2024-07-31 DIAGNOSIS — E03.9 HYPOTHYROIDISM, UNSPECIFIED TYPE: Primary | ICD-10-CM

## 2024-09-03 ENCOUNTER — OFFICE VISIT (OUTPATIENT)
Dept: FAMILY MEDICINE CLINIC | Age: 79
End: 2024-09-03
Payer: MEDICARE

## 2024-09-03 VITALS
WEIGHT: 164 LBS | HEIGHT: 61 IN | RESPIRATION RATE: 16 BRPM | DIASTOLIC BLOOD PRESSURE: 76 MMHG | OXYGEN SATURATION: 97 % | TEMPERATURE: 98 F | SYSTOLIC BLOOD PRESSURE: 118 MMHG | BODY MASS INDEX: 30.96 KG/M2 | HEART RATE: 50 BPM

## 2024-09-03 DIAGNOSIS — J01.40 ACUTE NON-RECURRENT PANSINUSITIS: Primary | ICD-10-CM

## 2024-09-03 PROCEDURE — 99213 OFFICE O/P EST LOW 20 MIN: CPT | Performed by: PHYSICIAN ASSISTANT

## 2024-09-03 PROCEDURE — 1123F ACP DISCUSS/DSCN MKR DOCD: CPT | Performed by: PHYSICIAN ASSISTANT

## 2024-09-03 PROCEDURE — 3074F SYST BP LT 130 MM HG: CPT | Performed by: PHYSICIAN ASSISTANT

## 2024-09-03 PROCEDURE — 3078F DIAST BP <80 MM HG: CPT | Performed by: PHYSICIAN ASSISTANT

## 2024-09-03 RX ORDER — CEFDINIR 300 MG/1
300 CAPSULE ORAL 2 TIMES DAILY
Qty: 20 CAPSULE | Refills: 0 | Status: SHIPPED | OUTPATIENT
Start: 2024-09-03 | End: 2024-09-13

## 2024-09-03 RX ORDER — CEFDINIR 300 MG/1
300 CAPSULE ORAL 2 TIMES DAILY
Qty: 20 CAPSULE | Refills: 0 | Status: SHIPPED
Start: 2024-09-03 | End: 2024-09-03

## 2024-09-03 NOTE — PROGRESS NOTES
Chief Complaint       Congestion and Dizziness      History of Present Illness   Source of history provided by:  patient.    Litzy Jesus is a 78 y.o. old female presenting to the walk in clinic for evaluation of nasal congestion, sinus pressure, bilateral ear pressure, mild intermittent dizziness, and mild sore throat for the past week. Denies any fever, chills, CP, dyspnea, LE edema, abdominal pain, vomiting, rash, or lethargy. Denies any hx of asthma or COPD. Patient denies recent sick exposures.       ROS    Unless otherwise stated in this report or unable to obtain because of the patient's clinical or mental status as evidenced by the medical record, this patients's positive and negative responses for Review of Systems, constitutional, psych, eyes, ENT, cardiovascular, respiratory, gastrointestinal, neurological, genitourinary, musculoskeletal, integument systems and systems related to the presenting problem are either stated in the preceding or were not pertinent or were negative for the symptoms and/or complaints related to the medical problem.    Past Medical History:  has a past medical history of Alzheimer's disease (HCC), Anxiety, Depression, Hypercholesterolemia, Hypertension, Hypothyroidism, and Sleep disorder.  Past Surgical History:  has a past surgical history that includes Hysterectomy.  Social History:  reports that she has never smoked. She has never used smokeless tobacco.  Family History: family history is not on file.   Allergies: Seasonal    Physical Exam         VS:  /76   Pulse 50   Temp 98 °F (36.7 °C) (Temporal)   Resp 16   Ht 1.549 m (5' 1\")   Wt 74.4 kg (164 lb)   SpO2 97%   BMI 30.99 kg/m²    Oxygen Saturation Interpretation: Normal.    Constitutional:  Alert, development consistent with age. NAD.  Head:  NC/NT. Airway patent.  Mild TTP over the bilateral frontal, ethmoid, and maxillary sinuses.  TMs are difficult to visualize due to patient's narrow canals but do

## 2024-09-15 ENCOUNTER — APPOINTMENT (OUTPATIENT)
Dept: ULTRASOUND IMAGING | Age: 79
End: 2024-09-15
Payer: MEDICARE

## 2024-09-15 ENCOUNTER — APPOINTMENT (OUTPATIENT)
Dept: GENERAL RADIOLOGY | Age: 79
End: 2024-09-15
Payer: MEDICARE

## 2024-09-15 ENCOUNTER — HOSPITAL ENCOUNTER (EMERGENCY)
Age: 79
Discharge: HOME | End: 2024-09-15
Attending: EMERGENCY MEDICINE
Payer: MEDICARE

## 2024-09-15 VITALS
HEART RATE: 92 BPM | SYSTOLIC BLOOD PRESSURE: 102 MMHG | TEMPERATURE: 99.7 F | BODY MASS INDEX: 30.96 KG/M2 | OXYGEN SATURATION: 92 % | DIASTOLIC BLOOD PRESSURE: 85 MMHG | HEIGHT: 61 IN | RESPIRATION RATE: 18 BRPM | WEIGHT: 164 LBS

## 2024-09-15 DIAGNOSIS — M71.21 BAKER'S CYST OF KNEE, RIGHT: Primary | ICD-10-CM

## 2024-09-15 DIAGNOSIS — U07.1 COVID-19: ICD-10-CM

## 2024-09-15 LAB
ALBUMIN SERPL-MCNC: 4 G/DL (ref 3.5–5.2)
ALP SERPL-CCNC: 54 U/L (ref 35–104)
ALT SERPL-CCNC: 18 U/L (ref 0–32)
ANION GAP SERPL CALCULATED.3IONS-SCNC: 14 MMOL/L (ref 7–16)
AST SERPL-CCNC: 28 U/L (ref 0–31)
BASOPHILS # BLD: 0.03 K/UL (ref 0–0.2)
BASOPHILS NFR BLD: 0 % (ref 0–2)
BILIRUB SERPL-MCNC: 1 MG/DL (ref 0–1.2)
BNP SERPL-MCNC: 481 PG/ML (ref 0–450)
BUN SERPL-MCNC: 13 MG/DL (ref 6–23)
CALCIUM SERPL-MCNC: 9 MG/DL (ref 8.6–10.2)
CHLORIDE SERPL-SCNC: 101 MMOL/L (ref 98–107)
CO2 SERPL-SCNC: 25 MMOL/L (ref 22–29)
CREAT SERPL-MCNC: 1 MG/DL (ref 0.5–1)
EOSINOPHIL # BLD: 0.01 K/UL (ref 0.05–0.5)
EOSINOPHILS RELATIVE PERCENT: 0 % (ref 0–6)
ERYTHROCYTE [DISTWIDTH] IN BLOOD BY AUTOMATED COUNT: 13.4 % (ref 11.5–15)
GFR, ESTIMATED: 58 ML/MIN/1.73M2
GLUCOSE SERPL-MCNC: 116 MG/DL (ref 74–99)
HCT VFR BLD AUTO: 45.8 % (ref 34–48)
HGB BLD-MCNC: 15 G/DL (ref 11.5–15.5)
IMM GRANULOCYTES # BLD AUTO: 0.07 K/UL (ref 0–0.58)
IMM GRANULOCYTES NFR BLD: 1 % (ref 0–5)
INFLUENZA A BY PCR: NOT DETECTED
INFLUENZA B BY PCR: NOT DETECTED
LIPASE SERPL-CCNC: 19 U/L (ref 13–60)
LYMPHOCYTES NFR BLD: 0.81 K/UL (ref 1.5–4)
LYMPHOCYTES RELATIVE PERCENT: 9 % (ref 20–42)
MCH RBC QN AUTO: 31.6 PG (ref 26–35)
MCHC RBC AUTO-ENTMCNC: 32.8 G/DL (ref 32–34.5)
MCV RBC AUTO: 96.6 FL (ref 80–99.9)
MONOCYTES NFR BLD: 1 K/UL (ref 0.1–0.95)
MONOCYTES NFR BLD: 11 % (ref 2–12)
NEUTROPHILS NFR BLD: 78 % (ref 43–80)
NEUTS SEG NFR BLD: 6.87 K/UL (ref 1.8–7.3)
PLATELET # BLD AUTO: 169 K/UL (ref 130–450)
PMV BLD AUTO: 10.3 FL (ref 7–12)
POTASSIUM SERPL-SCNC: 3.7 MMOL/L (ref 3.5–5)
PROT SERPL-MCNC: 7.2 G/DL (ref 6.4–8.3)
RBC # BLD AUTO: 4.74 M/UL (ref 3.5–5.5)
SARS-COV-2 RDRP RESP QL NAA+PROBE: DETECTED
SODIUM SERPL-SCNC: 140 MMOL/L (ref 132–146)
SPECIMEN DESCRIPTION: ABNORMAL
SPECIMEN SOURCE: NORMAL
STREP A, MOLECULAR: NEGATIVE
TROPONIN I SERPL HS-MCNC: <6 NG/L (ref 0–9)
WBC OTHER # BLD: 8.8 K/UL (ref 4.5–11.5)

## 2024-09-15 PROCEDURE — 83880 ASSAY OF NATRIURETIC PEPTIDE: CPT

## 2024-09-15 PROCEDURE — 71045 X-RAY EXAM CHEST 1 VIEW: CPT

## 2024-09-15 PROCEDURE — 80053 COMPREHEN METABOLIC PANEL: CPT

## 2024-09-15 PROCEDURE — 87502 INFLUENZA DNA AMP PROBE: CPT

## 2024-09-15 PROCEDURE — 6370000000 HC RX 637 (ALT 250 FOR IP)

## 2024-09-15 PROCEDURE — 87635 SARS-COV-2 COVID-19 AMP PRB: CPT

## 2024-09-15 PROCEDURE — 84484 ASSAY OF TROPONIN QUANT: CPT

## 2024-09-15 PROCEDURE — 93971 EXTREMITY STUDY: CPT

## 2024-09-15 PROCEDURE — 85025 COMPLETE CBC W/AUTO DIFF WBC: CPT

## 2024-09-15 PROCEDURE — 93005 ELECTROCARDIOGRAM TRACING: CPT

## 2024-09-15 PROCEDURE — 83690 ASSAY OF LIPASE: CPT

## 2024-09-15 PROCEDURE — 99285 EMERGENCY DEPT VISIT HI MDM: CPT

## 2024-09-15 PROCEDURE — 87651 STREP A DNA AMP PROBE: CPT

## 2024-09-15 RX ORDER — ACETAMINOPHEN 325 MG/1
650 TABLET ORAL ONCE
Status: COMPLETED | OUTPATIENT
Start: 2024-09-15 | End: 2024-09-15

## 2024-09-15 RX ADMIN — ACETAMINOPHEN 650 MG: 325 TABLET ORAL at 21:22

## 2024-09-15 ASSESSMENT — PAIN SCALES - GENERAL: PAINLEVEL_OUTOF10: 5

## 2024-09-15 ASSESSMENT — LIFESTYLE VARIABLES
HOW OFTEN DO YOU HAVE A DRINK CONTAINING ALCOHOL: NEVER
HOW MANY STANDARD DRINKS CONTAINING ALCOHOL DO YOU HAVE ON A TYPICAL DAY: PATIENT DOES NOT DRINK

## 2024-09-15 ASSESSMENT — PAIN DESCRIPTION - LOCATION: LOCATION: BACK

## 2024-09-15 ASSESSMENT — PAIN DESCRIPTION - ORIENTATION: ORIENTATION: LOWER

## 2024-09-15 ASSESSMENT — PAIN DESCRIPTION - DESCRIPTORS: DESCRIPTORS: ACHING

## 2024-09-16 LAB
EKG ATRIAL RATE: 75 BPM
EKG P AXIS: 48 DEGREES
EKG P-R INTERVAL: 170 MS
EKG Q-T INTERVAL: 372 MS
EKG QRS DURATION: 96 MS
EKG QTC CALCULATION (BAZETT): 415 MS
EKG R AXIS: -45 DEGREES
EKG T AXIS: 112 DEGREES
EKG VENTRICULAR RATE: 75 BPM

## 2024-09-16 PROCEDURE — 93010 ELECTROCARDIOGRAM REPORT: CPT | Performed by: INTERNAL MEDICINE

## 2024-09-24 ENCOUNTER — OFFICE VISIT (OUTPATIENT)
Dept: FAMILY MEDICINE CLINIC | Age: 79
End: 2024-09-24
Payer: MEDICARE

## 2024-09-24 VITALS
HEIGHT: 61 IN | RESPIRATION RATE: 15 BRPM | OXYGEN SATURATION: 98 % | SYSTOLIC BLOOD PRESSURE: 132 MMHG | BODY MASS INDEX: 30.58 KG/M2 | TEMPERATURE: 97.7 F | DIASTOLIC BLOOD PRESSURE: 82 MMHG | HEART RATE: 58 BPM | WEIGHT: 162 LBS

## 2024-09-24 DIAGNOSIS — U07.1 COVID-19: Primary | ICD-10-CM

## 2024-09-24 DIAGNOSIS — N18.31 CHRONIC KIDNEY DISEASE, STAGE 3A (HCC): ICD-10-CM

## 2024-09-24 PROCEDURE — 3075F SYST BP GE 130 - 139MM HG: CPT | Performed by: FAMILY MEDICINE

## 2024-09-24 PROCEDURE — 99214 OFFICE O/P EST MOD 30 MIN: CPT | Performed by: FAMILY MEDICINE

## 2024-09-24 PROCEDURE — 1123F ACP DISCUSS/DSCN MKR DOCD: CPT | Performed by: FAMILY MEDICINE

## 2024-09-24 PROCEDURE — 3079F DIAST BP 80-89 MM HG: CPT | Performed by: FAMILY MEDICINE

## 2024-09-24 PROCEDURE — G2211 COMPLEX E/M VISIT ADD ON: HCPCS | Performed by: FAMILY MEDICINE

## 2024-11-15 DIAGNOSIS — I10 HYPERTENSION, UNSPECIFIED TYPE: ICD-10-CM

## 2024-11-15 RX ORDER — LISINOPRIL 40 MG/1
40 TABLET ORAL DAILY
Qty: 90 TABLET | Refills: 1 | Status: SHIPPED | OUTPATIENT
Start: 2024-11-15

## 2024-12-04 ENCOUNTER — OFFICE VISIT (OUTPATIENT)
Dept: FAMILY MEDICINE CLINIC | Age: 79
End: 2024-12-04

## 2024-12-04 VITALS
TEMPERATURE: 97.3 F | WEIGHT: 164 LBS | SYSTOLIC BLOOD PRESSURE: 146 MMHG | OXYGEN SATURATION: 96 % | RESPIRATION RATE: 15 BRPM | DIASTOLIC BLOOD PRESSURE: 80 MMHG | HEART RATE: 61 BPM | HEIGHT: 60 IN | BODY MASS INDEX: 32.2 KG/M2

## 2024-12-04 DIAGNOSIS — J45.998 CHRONIC ALLERGIC BRONCHITIS: ICD-10-CM

## 2024-12-04 DIAGNOSIS — H61.23 BILATERAL IMPACTED CERUMEN: ICD-10-CM

## 2024-12-04 DIAGNOSIS — R35.0 URINARY FREQUENCY: ICD-10-CM

## 2024-12-04 DIAGNOSIS — E03.9 HYPOTHYROIDISM, UNSPECIFIED TYPE: ICD-10-CM

## 2024-12-04 DIAGNOSIS — F41.9 ANXIETY AND DEPRESSION: Primary | ICD-10-CM

## 2024-12-04 DIAGNOSIS — F32.A ANXIETY AND DEPRESSION: Primary | ICD-10-CM

## 2024-12-04 DIAGNOSIS — I10 HYPERTENSION, UNSPECIFIED TYPE: ICD-10-CM

## 2024-12-04 DIAGNOSIS — N18.31 CHRONIC KIDNEY DISEASE, STAGE 3A (HCC): ICD-10-CM

## 2024-12-04 LAB
BACTERIA: ABNORMAL
BILIRUBIN, URINE: NEGATIVE
COLOR, UA: ABNORMAL
EPITHELIAL CELLS, UA: ABNORMAL /HPF
GLUCOSE URINE: NEGATIVE MG/DL
KETONES, URINE: NEGATIVE MG/DL
LEUKOCYTE ESTERASE, URINE: NEGATIVE
MUCUS: PRESENT
NITRITE, URINE: NEGATIVE
PH, URINE: 5.5 (ref 5–9)
PROTEIN UA: NEGATIVE MG/DL
RBC UA: ABNORMAL /HPF
SPECIFIC GRAVITY UA: >1.03 (ref 1–1.03)
T4 FREE: 0.5 NG/DL (ref 0.9–1.7)
TSH SERPL DL<=0.05 MIU/L-ACNC: 16.49 UIU/ML (ref 0.27–4.2)
TURBIDITY: ABNORMAL
URINE HGB: NEGATIVE
UROBILINOGEN, URINE: 0.2 EU/DL (ref 0–1)
WBC UA: ABNORMAL /HPF

## 2024-12-04 RX ORDER — LEVOTHYROXINE SODIUM 50 UG/1
50 TABLET ORAL DAILY
Qty: 90 TABLET | Refills: 3 | Status: SHIPPED | OUTPATIENT
Start: 2024-12-04

## 2024-12-04 RX ORDER — MONTELUKAST SODIUM 10 MG/1
10 TABLET ORAL DAILY
Qty: 90 TABLET | Refills: 1 | Status: SHIPPED | OUTPATIENT
Start: 2024-12-04

## 2024-12-04 RX ORDER — SIMVASTATIN 20 MG
20 TABLET ORAL NIGHTLY
Qty: 90 TABLET | Refills: 1 | Status: SHIPPED | OUTPATIENT
Start: 2024-12-04

## 2024-12-04 RX ORDER — ESCITALOPRAM OXALATE 5 MG/1
TABLET ORAL
Qty: 90 TABLET | Refills: 1 | Status: SHIPPED | OUTPATIENT
Start: 2024-12-04

## 2024-12-04 NOTE — PROGRESS NOTES
[facility]    Litzy Jesus presents to the office today for   Chief Complaint   Patient presents with    Hypertension     Anxiety and Depression  She is taking Lexapro  She is using Hydroxyzine prn     Hypothyroid  She thinks she is taking Levothyroxine 50 mcg  TSH due now    Urinary Frequency  No burning  chronic     Hypertension  Taking meds as Rx  No chest pain or dyspnea    Review of Systems     BP (!) 146/80   Pulse 61   Temp 97.3 °F (36.3 °C) (Temporal)   Resp 15   Ht 1.524 m (5')   Wt 74.4 kg (164 lb)   SpO2 96%   BMI 32.03 kg/m²   Physical Exam  Constitutional:       Appearance: Normal appearance.   HENT:      Head: Normocephalic and atraumatic.   Eyes:      Extraocular Movements: Extraocular movements intact.      Conjunctiva/sclera: Conjunctivae normal.   Cardiovascular:      Rate and Rhythm: Normal rate.      Heart sounds: Normal heart sounds.   Pulmonary:      Effort: Pulmonary effort is normal.      Breath sounds: Normal breath sounds.   Skin:     General: Skin is warm.   Neurological:      Mental Status: She is alert and oriented to person, place, and time.   Psychiatric:         Mood and Affect: Mood normal.         Behavior: Behavior normal.            Current Outpatient Medications:     escitalopram (LEXAPRO) 5 MG tablet, TAKE 1 TABLET BY MOUTH EVERY DAY, Disp: 90 tablet, Rfl: 1    levothyroxine (SYNTHROID) 50 MCG tablet, Take 1 tablet by mouth daily, Disp: 90 tablet, Rfl: 3    montelukast (SINGULAIR) 10 MG tablet, Take 1 tablet by mouth daily, Disp: 90 tablet, Rfl: 1    simvastatin (ZOCOR) 20 MG tablet, Take 1 tablet by mouth nightly, Disp: 90 tablet, Rfl: 1    lisinopril (PRINIVIL;ZESTRIL) 40 MG tablet, Take 1 tablet by mouth daily, Disp: 90 tablet, Rfl: 1    hydroCHLOROthiazide (HYDRODIURIL) 25 MG tablet, Take 1 tablet by mouth daily (Patient not taking: Reported on 12/4/2024), Disp: 90 tablet, Rfl: 1     Past Medical History:   Diagnosis Date    Alzheimer's disease

## 2024-12-05 LAB
CULTURE: NORMAL
CULTURE: NORMAL
SPECIMEN DESCRIPTION: NORMAL

## 2025-03-12 ENCOUNTER — OFFICE VISIT (OUTPATIENT)
Dept: FAMILY MEDICINE CLINIC | Age: 80
End: 2025-03-12
Payer: MEDICARE

## 2025-03-12 VITALS
OXYGEN SATURATION: 95 % | HEIGHT: 60 IN | TEMPERATURE: 97.8 F | HEART RATE: 61 BPM | SYSTOLIC BLOOD PRESSURE: 130 MMHG | DIASTOLIC BLOOD PRESSURE: 78 MMHG | RESPIRATION RATE: 16 BRPM | WEIGHT: 164 LBS | BODY MASS INDEX: 32.2 KG/M2

## 2025-03-12 DIAGNOSIS — R19.8 IRREGULAR BOWEL HABITS: ICD-10-CM

## 2025-03-12 DIAGNOSIS — N18.31 CHRONIC KIDNEY DISEASE, STAGE 3A (HCC): ICD-10-CM

## 2025-03-12 DIAGNOSIS — E03.9 HYPOTHYROIDISM, UNSPECIFIED TYPE: Primary | ICD-10-CM

## 2025-03-12 DIAGNOSIS — Z12.11 SCREENING FOR COLON CANCER: ICD-10-CM

## 2025-03-12 DIAGNOSIS — E03.9 HYPOTHYROIDISM, UNSPECIFIED TYPE: ICD-10-CM

## 2025-03-12 LAB
ALBUMIN: 4.4 G/DL (ref 3.5–5.2)
ALP BLD-CCNC: 66 U/L (ref 35–104)
ALT SERPL-CCNC: 13 U/L (ref 0–32)
ANION GAP SERPL CALCULATED.3IONS-SCNC: 20 MMOL/L (ref 7–16)
AST SERPL-CCNC: 25 U/L (ref 0–31)
BASOPHILS ABSOLUTE: 0.04 K/UL (ref 0–0.2)
BASOPHILS RELATIVE PERCENT: 1 % (ref 0–2)
BILIRUB SERPL-MCNC: 0.5 MG/DL (ref 0–1.2)
BUN BLDV-MCNC: 13 MG/DL (ref 6–23)
CALCIUM SERPL-MCNC: 9.9 MG/DL (ref 8.6–10.2)
CHLORIDE BLD-SCNC: 110 MMOL/L (ref 98–107)
CO2: 20 MMOL/L (ref 22–29)
CREAT SERPL-MCNC: 0.9 MG/DL (ref 0.5–1)
EOSINOPHILS ABSOLUTE: 0.13 K/UL (ref 0.05–0.5)
EOSINOPHILS RELATIVE PERCENT: 3 % (ref 0–6)
GFR, ESTIMATED: 63 ML/MIN/1.73M2
GLUCOSE BLD-MCNC: 103 MG/DL (ref 74–99)
HCT VFR BLD CALC: 48 % (ref 34–48)
HEMOGLOBIN: 15.5 G/DL (ref 11.5–15.5)
IMMATURE GRANULOCYTES %: 0 % (ref 0–5)
IMMATURE GRANULOCYTES ABSOLUTE: <0.03 K/UL (ref 0–0.58)
LYMPHOCYTES ABSOLUTE: 1.55 K/UL (ref 1.5–4)
LYMPHOCYTES RELATIVE PERCENT: 30 % (ref 20–42)
MCH RBC QN AUTO: 31.8 PG (ref 26–35)
MCHC RBC AUTO-ENTMCNC: 32.3 G/DL (ref 32–34.5)
MCV RBC AUTO: 98.6 FL (ref 80–99.9)
MONOCYTES ABSOLUTE: 0.61 K/UL (ref 0.1–0.95)
MONOCYTES RELATIVE PERCENT: 12 % (ref 2–12)
NEUTROPHILS ABSOLUTE: 2.78 K/UL (ref 1.8–7.3)
NEUTROPHILS RELATIVE PERCENT: 54 % (ref 43–80)
PDW BLD-RTO: 13.9 % (ref 11.5–15)
PLATELET # BLD: 194 K/UL (ref 130–450)
PMV BLD AUTO: 10.8 FL (ref 7–12)
POTASSIUM SERPL-SCNC: 4.2 MMOL/L (ref 3.5–5)
RBC # BLD: 4.87 M/UL (ref 3.5–5.5)
SODIUM BLD-SCNC: 150 MMOL/L (ref 132–146)
T4 FREE: 0.9 NG/DL (ref 0.9–1.7)
TOTAL PROTEIN: 7.3 G/DL (ref 6.4–8.3)
TSH SERPL DL<=0.05 MIU/L-ACNC: 15.47 UIU/ML (ref 0.27–4.2)
WBC # BLD: 5.1 K/UL (ref 4.5–11.5)

## 2025-03-12 PROCEDURE — 3078F DIAST BP <80 MM HG: CPT | Performed by: FAMILY MEDICINE

## 2025-03-12 PROCEDURE — 1123F ACP DISCUSS/DSCN MKR DOCD: CPT | Performed by: FAMILY MEDICINE

## 2025-03-12 PROCEDURE — 3075F SYST BP GE 130 - 139MM HG: CPT | Performed by: FAMILY MEDICINE

## 2025-03-12 PROCEDURE — G2211 COMPLEX E/M VISIT ADD ON: HCPCS | Performed by: FAMILY MEDICINE

## 2025-03-12 PROCEDURE — 1159F MED LIST DOCD IN RCRD: CPT | Performed by: FAMILY MEDICINE

## 2025-03-12 PROCEDURE — 99214 OFFICE O/P EST MOD 30 MIN: CPT | Performed by: FAMILY MEDICINE

## 2025-03-12 RX ORDER — LOPERAMIDE HYDROCHLORIDE 2 MG/1
2 CAPSULE ORAL 4 TIMES DAILY PRN
COMMUNITY

## 2025-03-12 SDOH — ECONOMIC STABILITY: FOOD INSECURITY: WITHIN THE PAST 12 MONTHS, THE FOOD YOU BOUGHT JUST DIDN'T LAST AND YOU DIDN'T HAVE MONEY TO GET MORE.: NEVER TRUE

## 2025-03-12 SDOH — ECONOMIC STABILITY: FOOD INSECURITY: WITHIN THE PAST 12 MONTHS, YOU WORRIED THAT YOUR FOOD WOULD RUN OUT BEFORE YOU GOT MONEY TO BUY MORE.: NEVER TRUE

## 2025-03-12 ASSESSMENT — PATIENT HEALTH QUESTIONNAIRE - PHQ9
10. IF YOU CHECKED OFF ANY PROBLEMS, HOW DIFFICULT HAVE THESE PROBLEMS MADE IT FOR YOU TO DO YOUR WORK, TAKE CARE OF THINGS AT HOME, OR GET ALONG WITH OTHER PEOPLE: NOT DIFFICULT AT ALL
2. FEELING DOWN, DEPRESSED OR HOPELESS: NOT AT ALL
SUM OF ALL RESPONSES TO PHQ QUESTIONS 1-9: 0
6. FEELING BAD ABOUT YOURSELF - OR THAT YOU ARE A FAILURE OR HAVE LET YOURSELF OR YOUR FAMILY DOWN: NOT AT ALL
SUM OF ALL RESPONSES TO PHQ QUESTIONS 1-9: 0
3. TROUBLE FALLING OR STAYING ASLEEP: NOT AT ALL
5. POOR APPETITE OR OVEREATING: NOT AT ALL
SUM OF ALL RESPONSES TO PHQ QUESTIONS 1-9: 0
9. THOUGHTS THAT YOU WOULD BE BETTER OFF DEAD, OR OF HURTING YOURSELF: NOT AT ALL
1. LITTLE INTEREST OR PLEASURE IN DOING THINGS: NOT AT ALL
7. TROUBLE CONCENTRATING ON THINGS, SUCH AS READING THE NEWSPAPER OR WATCHING TELEVISION: NOT AT ALL
4. FEELING TIRED OR HAVING LITTLE ENERGY: NOT AT ALL
SUM OF ALL RESPONSES TO PHQ QUESTIONS 1-9: 0
8. MOVING OR SPEAKING SO SLOWLY THAT OTHER PEOPLE COULD HAVE NOTICED. OR THE OPPOSITE, BEING SO FIGETY OR RESTLESS THAT YOU HAVE BEEN MOVING AROUND A LOT MORE THAN USUAL: NOT AT ALL

## 2025-03-12 NOTE — PROGRESS NOTES
[facility]    Litzy Jesus presents to the office today for   Chief Complaint   Patient presents with    Diarrhea     On and off, since January    Nausea     On and off       History of Present Illness  The patient presents for evaluation of diarrhea, dizziness, and arthritis.    She reports intermittent episodes of diarrhea, with some days being symptom-free. She also experiences flatulence and occasional constipation. Her stools are described as dark brown, but she has not observed any blood. She is not experiencing any vomiting or upper gastrointestinal symptoms such as nausea or heartburn. She occasionally experiences abdominal discomfort and has been experiencing sleep disturbances. She has been advised to exercise caution when taking Kaopectate. She has been fasting in preparation for blood work, consuming only water at night and a small amount of juice in the morning with her medication.    She has been experiencing episodes of dizziness, which she suspects may be related to her allergies.    She has a known diagnosis of arthritis. She is not interested in pursuing physical therapy at this time.    MEDICATIONS  Current: Kaopectate    Review of Systems     /78   Pulse 61   Temp 97.8 °F (36.6 °C) (Temporal)   Resp 16   Ht 1.524 m (5')   Wt 74.4 kg (164 lb)   SpO2 95%   BMI 32.03 kg/m²   Physical Exam  Constitutional:       Appearance: Normal appearance.   HENT:      Head: Normocephalic and atraumatic.   Eyes:      Extraocular Movements: Extraocular movements intact.      Conjunctiva/sclera: Conjunctivae normal.   Cardiovascular:      Rate and Rhythm: Normal rate.      Heart sounds: Normal heart sounds.   Pulmonary:      Effort: Pulmonary effort is normal.      Breath sounds: Normal breath sounds.   Abdominal:      Palpations: Abdomen is soft.      Tenderness: There is no abdominal tenderness.   Skin:     General: Skin is warm.   Neurological:      Mental Status: She is alert and

## 2025-03-13 ENCOUNTER — RESULTS FOLLOW-UP (OUTPATIENT)
Dept: FAMILY MEDICINE CLINIC | Age: 80
End: 2025-03-13

## 2025-03-13 DIAGNOSIS — E03.9 HYPOTHYROIDISM, UNSPECIFIED TYPE: ICD-10-CM

## 2025-03-13 DIAGNOSIS — E87.0 HYPERNATREMIA: Primary | ICD-10-CM

## 2025-03-19 RX ORDER — LEVOTHYROXINE SODIUM 75 UG/1
75 TABLET ORAL DAILY
Qty: 30 TABLET | Refills: 1 | Status: SHIPPED
Start: 2025-03-19 | End: 2025-03-24 | Stop reason: SDUPTHER

## 2025-03-24 DIAGNOSIS — E03.9 HYPOTHYROIDISM, UNSPECIFIED TYPE: ICD-10-CM

## 2025-03-24 RX ORDER — LEVOTHYROXINE SODIUM 75 UG/1
75 TABLET ORAL DAILY
Qty: 30 TABLET | Refills: 1 | Status: SHIPPED | OUTPATIENT
Start: 2025-03-24

## 2025-03-24 NOTE — TELEPHONE ENCOUNTER
Last Appointment:  3/12/2025  Future Appointments   Date Time Provider Department Center   4/29/2025  2:15 PM Beau Brooks MD [facility]    Rx was sent to the wrong pharmacy.

## 2025-04-29 ENCOUNTER — OFFICE VISIT (OUTPATIENT)
Dept: SURGERY | Age: 80
End: 2025-04-29
Payer: MEDICARE

## 2025-04-29 VITALS
OXYGEN SATURATION: 97 % | SYSTOLIC BLOOD PRESSURE: 155 MMHG | HEIGHT: 60 IN | DIASTOLIC BLOOD PRESSURE: 88 MMHG | BODY MASS INDEX: 31.8 KG/M2 | RESPIRATION RATE: 18 BRPM | HEART RATE: 56 BPM | WEIGHT: 162 LBS

## 2025-04-29 DIAGNOSIS — R13.14 PHARYNGOESOPHAGEAL DYSPHAGIA: Primary | ICD-10-CM

## 2025-04-29 DIAGNOSIS — R10.13 EPIGASTRIC PAIN: ICD-10-CM

## 2025-04-29 DIAGNOSIS — R19.4 CHANGE IN BOWEL HABITS: ICD-10-CM

## 2025-04-29 PROCEDURE — 1123F ACP DISCUSS/DSCN MKR DOCD: CPT | Performed by: SURGERY

## 2025-04-29 PROCEDURE — 1159F MED LIST DOCD IN RCRD: CPT | Performed by: SURGERY

## 2025-04-29 PROCEDURE — 99203 OFFICE O/P NEW LOW 30 MIN: CPT | Performed by: SURGERY

## 2025-04-29 PROCEDURE — 3077F SYST BP >= 140 MM HG: CPT | Performed by: SURGERY

## 2025-04-29 PROCEDURE — 3079F DIAST BP 80-89 MM HG: CPT | Performed by: SURGERY

## 2025-04-29 NOTE — PROGRESS NOTES
not on any blood thinners. She had a colonoscopy 4 to 5 years ago, which showed no polyps, and she has never had an upper endoscopy. She initially thought she was here for Cologuard testing but is now open to having an upper endoscopy. She has not experienced any abnormal weight loss. The only abdominal procedure she has had is a cholecystectomy in 2000.    She also reports occasional upper epigastric discomfort, describing it as a sensation of gas. She does not experience reflux or heartburn. She occasionally experiences difficulty swallowing food, which she attributes to rapid eating and inadequate chewing. She notes that these symptoms improve when she slows down her eating pace.    Supplemental Information  She has been under significant stress recently and has been dealing with a cold and allergies.        Results      Labs as below.    Recent note from PCP reviewed    Past Medical History:   Diagnosis Date    Alzheimer's disease (HCC)     Anxiety     Depression     Hypercholesterolemia     Hypertension     Hypothyroidism     Sleep disorder        Past Surgical History:   Procedure Laterality Date    CHOLECYSTECTOMY      Dr Gordy Beckford [phone]    HYSTERECTOMY (CERVIX STATUS UNKNOWN)      Total not sure of date       Prior to Admission medications    Medication Sig Start Date End Date Taking? Authorizing Provider   levothyroxine (SYNTHROID) 75 MCG tablet Take 1 tablet by mouth daily 3/24/25  Yes Kristin Walker MD   loperamide (IMODIUM) 2 MG capsule Take 1 capsule by mouth 4 times daily as needed for Diarrhea   Yes Provider, MD Carly   escitalopram (LEXAPRO) 5 MG tablet TAKE 1 TABLET BY MOUTH EVERY DAY 12/4/24  Yes Kristin Walker MD   montelukast (SINGULAIR) 10 MG tablet Take 1 tablet by mouth daily 12/4/24  Yes Kristin Walker MD   simvastatin (ZOCOR) 20 MG tablet Take 1 tablet by mouth nightly 12/4/24  Yes Kristin Walker MD   lisinopril (PRINIVIL;ZESTRIL) 40 MG tablet Take 1 tablet

## 2025-05-05 ENCOUNTER — TELEPHONE (OUTPATIENT)
Dept: SURGERY | Age: 80
End: 2025-05-05

## 2025-05-05 NOTE — TELEPHONE ENCOUNTER
MA called patient to schedule her EGD with dilation/colonoscopy on 05-28-25.  Patient stated that she wouldn't be able to find a ride.  MA suggested patient calling her insurance company or [employer].  Patient stated that she wanted to speak with her family before scheduling.  MA informed patient that she can put the patient on the 28th since there was a cancellation and if she cannot make it we can cancel because Dr Brooks is scheduling in August.  Patient stated, \"I would like to wait until August but first speak with my family\".  MA informed patient to call after she speaks with her family.  Electronically signed by Bia Terry MA on 5/5/2025 at 11:22 AM

## 2025-05-09 ENCOUNTER — PREP FOR PROCEDURE (OUTPATIENT)
Dept: SURGERY | Age: 80
End: 2025-05-09

## 2025-05-09 ENCOUNTER — TELEPHONE (OUTPATIENT)
Dept: SURGERY | Age: 80
End: 2025-05-09

## 2025-05-09 DIAGNOSIS — R10.13 EPIGASTRIC PAIN: ICD-10-CM

## 2025-05-09 DIAGNOSIS — R13.14 PHARYNGOESOPHAGEAL DYSPHAGIA: ICD-10-CM

## 2025-05-09 DIAGNOSIS — R19.4 CHANGE IN BOWEL HABITS: ICD-10-CM

## 2025-05-09 NOTE — TELEPHONE ENCOUNTER
Litzy Jesus is scheduled for EGD possible dilation/colonoscopy with Dr Brooks on 06-11-25 at [facility]. Patient needs to be NPO after midnight the night before procedure. All surgery instructions were explained to the patient and a surgery letter was also mailed out. MA informed patient that PAT will also be calling to review pre-op instructions and medications. Patient verbalized understanding.  Electronically signed by Bia Terry MA on 5/9/2025 at 6:02 AM

## 2025-06-06 NOTE — PROGRESS NOTES
[facility] PRE-ADMISSION TESTING INSTRUCTIONS  PROCEDURE TIME: 1:30 PM                               ARRIVAL TIME: 1200 PM  The Preadmission Testing patient is instructed accordingly using the following criteria (check applicable):    ARRIVAL INSTRUCTIONS:  [x] Parking the day of Surgery is located in the Main Entrance lot.  Upon entering the door, make an immediate right to the surgery reception desk    [x] Bring photo ID and insurance card    [] Bring in a copy of Living will or Durable Power of  papers.    [x] Please be sure to arrange for responsible adult to provide transportation to and from the hospital    [x] Please arrange for responsible adult to be with you for the 24 hour period post procedure due to having anesthesia    [x] If you awake am of surgery not feeling well or have temperature >100 please call [phone]    GENERAL INSTRUCTIONS:    [x] May have WATER until 4 hours prior to surgery.               No gum, candy or mints.    [x] You may brush your teeth, but do not swallow any water    [x] Take medications as instructed with 1-2 oz of water (LEXAPRO)    [] Stop herbal supplements and vitamins 5 days prior to procedure    [] Follow preop dosing of blood thinners per physician instructions    [] Take 1/2 dose of evening insulin, but no insulin after midnight    [] No oral diabetic medications after midnight    [] If diabetic and have low blood sugar or feel symptomatic, take 1-2oz apple juice only    [] Bring inhalers day of surgery    [] Bring C-PAP/ Bi-Pap day of surgery    [] Bring urine specimen day of surgery    [x] Shower or bath with soap, lather and rinse well, AM of Surgery, no lotion, powders or creams to surgical site    [x] Follow bowel prep as instructed per surgeon    [x] No tobacco products within 24 hours of surgery     [x] No alcohol or illegal drug use within 24 hours of surgery.    [x] Jewelry, body piercing's, eyeglasses, contact lenses and  dentures are not permitted into surgery (bring cases)      [x] Please do not wear any nail polish, make up or hair products on the day of surgery    [x] You can expect a call the business day prior to procedure to notify you if your arrival time changes    [x] If you receive a survey after surgery we would greatly appreciate your comments    [] Parent/guardian of a minor must accompany their child and remain on the premises  the entire time they are under our care     [] Pediatric patients may bring favorite toy, blanket or comfort item with them    [] A caregiver or family member must remain with the patient during their stay if they are mentally handicapped, have dementia, disoriented or unable to use a call light or would be a safety concern if left unattended    [x] Please notify surgeon if you develop any illness between now and time of surgery (cold, cough, sore throat, fever, nausea, vomiting) or any signs of infections  including skin, wounds, and dental.    [x]  The Outpatient Pharmacy is available to fill your prescription here on your day of surgery, ask your preop nurse for details    [] Other instructions

## 2025-06-10 ENCOUNTER — ANESTHESIA EVENT (OUTPATIENT)
Dept: ENDOSCOPY | Age: 80
End: 2025-06-10
Payer: MEDICARE

## 2025-06-10 ASSESSMENT — LIFESTYLE VARIABLES: SMOKING_STATUS: 0

## 2025-06-10 NOTE — ANESTHESIA PRE PROCEDURE
Department of Anesthesiology  Preprocedure Note       Name:  Litzy Jesus   Age:  79 y.o.  :  1945                                          MRN:  [id number]         Date:  6/10/2025      Surgeon: Surgeon(s):  Beau Brooks MD    Procedure: Procedure(s):  ESOPHAGOGASTRODUODENOSCOPY POSSIBLE DILATION  COLONOSCOPY DIAGNOSTIC    Medications prior to admission:   Prior to Admission medications    Medication Sig Start Date End Date Taking? Authorizing Provider   levothyroxine (SYNTHROID) 75 MCG tablet Take 1 tablet by mouth daily 3/24/25   Kristin Walker MD   loperamide (IMODIUM) 2 MG capsule Take 1 capsule by mouth 4 times daily as needed for Diarrhea    Provider, MD Carly   escitalopram (LEXAPRO) 5 MG tablet TAKE 1 TABLET BY MOUTH EVERY DAY 24   Kristin Walker MD   montelukast (SINGULAIR) 10 MG tablet Take 1 tablet by mouth daily 24   Kristin Walker MD   simvastatin (ZOCOR) 20 MG tablet Take 1 tablet by mouth nightly 24   Kristin Walker MD   lisinopril (PRINIVIL;ZESTRIL) 40 MG tablet Take 1 tablet by mouth daily 11/15/24   Kristin Walker MD       Current medications:    Current Facility-Administered Medications   Medication Dose Route Frequency Provider Last Rate Last Admin    pneumococcal 13-valent conjugate (PREVNAR) injection 0.5 mL  0.5 mL IntraMUSCular Once Kristin Walker MD         Current Outpatient Medications   Medication Sig Dispense Refill    levothyroxine (SYNTHROID) 75 MCG tablet Take 1 tablet by mouth daily 30 tablet 1    loperamide (IMODIUM) 2 MG capsule Take 1 capsule by mouth 4 times daily as needed for Diarrhea      escitalopram (LEXAPRO) 5 MG tablet TAKE 1 TABLET BY MOUTH EVERY DAY 90 tablet 1    montelukast (SINGULAIR) 10 MG tablet Take 1 tablet by mouth daily 90 tablet 1    simvastatin (ZOCOR) 20 MG tablet Take 1 tablet by mouth nightly 90 tablet 1    lisinopril (PRINIVIL;ZESTRIL) 40 MG tablet Take 1 tablet by mouth daily 90 tablet 1

## 2025-06-11 ENCOUNTER — ANESTHESIA (OUTPATIENT)
Dept: ENDOSCOPY | Age: 80
End: 2025-06-11
Payer: MEDICARE

## 2025-06-11 ENCOUNTER — HOSPITAL ENCOUNTER (OUTPATIENT)
Age: 80
Setting detail: OUTPATIENT SURGERY
Discharge: HOME | End: 2025-06-11
Attending: SURGERY | Admitting: SURGERY
Payer: MEDICARE

## 2025-06-11 VITALS
HEART RATE: 67 BPM | RESPIRATION RATE: 18 BRPM | TEMPERATURE: 98.5 F | OXYGEN SATURATION: 96 % | DIASTOLIC BLOOD PRESSURE: 68 MMHG | HEIGHT: 60 IN | SYSTOLIC BLOOD PRESSURE: 131 MMHG | BODY MASS INDEX: 31.64 KG/M2

## 2025-06-11 DIAGNOSIS — R13.14 PHARYNGOESOPHAGEAL DYSPHAGIA: ICD-10-CM

## 2025-06-11 DIAGNOSIS — R10.13 EPIGASTRIC PAIN: ICD-10-CM

## 2025-06-11 DIAGNOSIS — R19.4 CHANGE IN BOWEL HABITS: ICD-10-CM

## 2025-06-11 PROCEDURE — 43450 DILATE ESOPHAGUS 1/MULT PASS: CPT | Performed by: SURGERY

## 2025-06-11 PROCEDURE — 43239 EGD BIOPSY SINGLE/MULTIPLE: CPT | Performed by: SURGERY

## 2025-06-11 PROCEDURE — 6360000002 HC RX W HCPCS: Performed by: ANESTHESIOLOGY

## 2025-06-11 PROCEDURE — 3700000000 HC ANESTHESIA ATTENDED CARE: Performed by: SURGERY

## 2025-06-11 PROCEDURE — 88342 IMHCHEM/IMCYTCHM 1ST ANTB: CPT

## 2025-06-11 PROCEDURE — 7100000010 HC PHASE II RECOVERY - FIRST 15 MIN: Performed by: SURGERY

## 2025-06-11 PROCEDURE — 88312 SPECIAL STAINS GROUP 1: CPT

## 2025-06-11 PROCEDURE — 3700000001 HC ADD 15 MINUTES (ANESTHESIA): Performed by: SURGERY

## 2025-06-11 PROCEDURE — 6360000002 HC RX W HCPCS

## 2025-06-11 PROCEDURE — 2709999900 HC NON-CHARGEABLE SUPPLY: Performed by: SURGERY

## 2025-06-11 PROCEDURE — 88305 TISSUE EXAM BY PATHOLOGIST: CPT

## 2025-06-11 PROCEDURE — 3609017700 HC EGD DILATION GASTRIC/DUODENAL STRICTURE: Performed by: SURGERY

## 2025-06-11 PROCEDURE — 45380 COLONOSCOPY AND BIOPSY: CPT | Performed by: SURGERY

## 2025-06-11 PROCEDURE — 7100000011 HC PHASE II RECOVERY - ADDTL 15 MIN: Performed by: SURGERY

## 2025-06-11 PROCEDURE — 2580000003 HC RX 258

## 2025-06-11 PROCEDURE — 3609010300 HC COLONOSCOPY W/BIOPSY SINGLE/MULTIPLE: Performed by: SURGERY

## 2025-06-11 RX ORDER — SODIUM CHLORIDE 9 MG/ML
INJECTION, SOLUTION INTRAVENOUS
Status: DISCONTINUED | OUTPATIENT
Start: 2025-06-11 | End: 2025-06-11 | Stop reason: SDUPTHER

## 2025-06-11 RX ORDER — HYDRALAZINE HYDROCHLORIDE 20 MG/ML
INJECTION INTRAMUSCULAR; INTRAVENOUS
Status: DISCONTINUED | OUTPATIENT
Start: 2025-06-11 | End: 2025-06-11 | Stop reason: SDUPTHER

## 2025-06-11 RX ORDER — PROPOFOL 10 MG/ML
INJECTION, EMULSION INTRAVENOUS
Status: DISCONTINUED | OUTPATIENT
Start: 2025-06-11 | End: 2025-06-11 | Stop reason: SDUPTHER

## 2025-06-11 RX ORDER — ONDANSETRON 2 MG/ML
4 INJECTION INTRAMUSCULAR; INTRAVENOUS ONCE
Status: COMPLETED | OUTPATIENT
Start: 2025-06-11 | End: 2025-06-11

## 2025-06-11 RX ADMIN — SODIUM CHLORIDE: 9 INJECTION, SOLUTION INTRAVENOUS at 12:04

## 2025-06-11 RX ADMIN — HYDRALAZINE HYDROCHLORIDE 10 MG: 20 INJECTION INTRAMUSCULAR; INTRAVENOUS at 12:33

## 2025-06-11 RX ADMIN — HYDRALAZINE HYDROCHLORIDE 10 MG: 20 INJECTION INTRAMUSCULAR; INTRAVENOUS at 12:23

## 2025-06-11 RX ADMIN — ONDANSETRON 4 MG: 2 INJECTION, SOLUTION INTRAMUSCULAR; INTRAVENOUS at 13:12

## 2025-06-11 RX ADMIN — PROPOFOL 400 MG: 10 INJECTION, EMULSION INTRAVENOUS at 12:06

## 2025-06-11 ASSESSMENT — PAIN - FUNCTIONAL ASSESSMENT
PAIN_FUNCTIONAL_ASSESSMENT: 0-10
PAIN_FUNCTIONAL_ASSESSMENT: 0-10
PAIN_FUNCTIONAL_ASSESSMENT: PREVENTS OR INTERFERES SOME ACTIVE ACTIVITIES AND ADLS

## 2025-06-11 ASSESSMENT — PAIN DESCRIPTION - PAIN TYPE: TYPE: ACUTE PAIN

## 2025-06-11 ASSESSMENT — PAIN DESCRIPTION - ONSET: ONSET: ON-GOING

## 2025-06-11 ASSESSMENT — PAIN DESCRIPTION - FREQUENCY: FREQUENCY: CONTINUOUS

## 2025-06-11 ASSESSMENT — PAIN DESCRIPTION - ORIENTATION: ORIENTATION: UPPER;LEFT

## 2025-06-11 ASSESSMENT — PAIN DESCRIPTION - DESCRIPTORS: DESCRIPTORS: ACHING

## 2025-06-11 ASSESSMENT — PAIN DESCRIPTION - LOCATION: LOCATION: ABDOMEN

## 2025-06-11 ASSESSMENT — PAIN SCALES - GENERAL: PAINLEVEL_OUTOF10: 5

## 2025-06-11 NOTE — ANESTHESIA POSTPROCEDURE EVALUATION
Department of Anesthesiology  Postprocedure Note    Patient: Litzy Jesus  MRN: [id number]  YOB: 1945  Date of evaluation: 6/11/2025    Procedure Summary       Date: 06/11/25 Room / Location: [place] [address] / [facility]    Anesthesia Start: 1204 Anesthesia Stop: 1238    Procedures:       ESOPHAGOGASTRODUODENOSCOPY  DILATION      COLONOSCOPY BIOPSY      ESOPHAGOGASTRODUODENOSCOPY BIOPSY Diagnosis:       Pharyngoesophageal dysphagia      Epigastric pain      Change in bowel habits      (Pharyngoesophageal dysphagia [R13.14])      (Epigastric pain [R10.13])      (Change in bowel habits [R19.4])    Surgeons: Beau Brooks MD Responsible Provider: Iban Wang DO    Anesthesia Type: MAC ASA Status: 3            Anesthesia Type: No value filed.    [place] Phase I:      Claud[place] Phase II:      Anesthesia Post Evaluation    Patient location during evaluation: PACU  Patient participation: complete - patient participated  Level of consciousness: awake  Pain score: 0  Airway patency: patent  Nausea & Vomiting: no nausea and no vomiting  Cardiovascular status: blood pressure returned to baseline and hemodynamically stable  Respiratory status: acceptable  Hydration status: euvolemic  Pain management: adequate        No notable events documented.

## 2025-06-11 NOTE — H&P
Signed       Expand All Collapse All[]Expand All by Default         [facility] Note     Assessment & Plan  1. Bowel irregularities.  Her symptoms may be attributed to the absence of a gallbladder, leading to intolerance of fatty foods. A low FODMAP diet will be recommended to help manage her bowel movements and alleviate symptoms. She is advised to incorporate more fiber into her diet, such as Metamucil or Benefiber, and consider using fiber pills if preferred. The use of an over-the-counter probiotic is also suggested. If no improvement, further studies and repeat colonoscopy can be considered.     2. Upper epigastric discomfort.  She reports occasional upper epigastric discomfort, which feels like gas. She does not experience reflux or heartburn. She is advised to avoid fatty foods and follow the low FODMAP diet to see if it alleviates her symptoms. An upper endoscopy will be performed to ensure there are no underlying issues.     3. Pharyngoesophageal dysphagia.  An upper endoscopy will be performed to ensure there are no underlying issues, along with possible dilation.     PROCEDURE  The patient underwent a cholecystectomy in 2000.  A colonoscopy was performed 4 to 5 years ago, which showed no polyps.     Return for EGD.     Litzy was seen today for new patient.     Diagnoses and all orders for this visit:     Pharyngoesophageal dysphagia     Epigastric pain     Change in bowel habits                   Chief Complaint   Patient presents with    New Patient       Colon screen and abd. bowel sounds (Ref Dr Walker)               PCP: Kristin Walker MD  CC: Kristin Walker MD      Litzy Jesus is a 79 y.o. female.     History of Present Illness  The patient presents for evaluation of bowel issues.     She has been experiencing intermittent bowel irregularities since December, characterized by loose, watery stools that are dependent on her diet. She reports no presence of blood in

## 2025-06-20 LAB — SURGICAL PATHOLOGY REPORT: NORMAL

## 2025-07-01 DIAGNOSIS — I10 HYPERTENSION, UNSPECIFIED TYPE: ICD-10-CM

## 2025-07-01 DIAGNOSIS — F41.9 ANXIETY AND DEPRESSION: ICD-10-CM

## 2025-07-01 DIAGNOSIS — J45.998 CHRONIC ALLERGIC BRONCHITIS: ICD-10-CM

## 2025-07-01 DIAGNOSIS — F32.A ANXIETY AND DEPRESSION: ICD-10-CM

## 2025-07-01 RX ORDER — MONTELUKAST SODIUM 10 MG/1
10 TABLET ORAL DAILY
Qty: 90 TABLET | Refills: 1 | Status: SHIPPED | OUTPATIENT
Start: 2025-07-01

## 2025-07-01 RX ORDER — SIMVASTATIN 20 MG
20 TABLET ORAL NIGHTLY
Qty: 90 TABLET | Refills: 1 | Status: SHIPPED | OUTPATIENT
Start: 2025-07-01

## 2025-07-01 RX ORDER — LISINOPRIL 40 MG/1
40 TABLET ORAL DAILY
Qty: 90 TABLET | Refills: 1 | Status: SHIPPED | OUTPATIENT
Start: 2025-07-01

## 2025-07-01 RX ORDER — ESCITALOPRAM OXALATE 5 MG/1
5 TABLET ORAL DAILY
Qty: 90 TABLET | Refills: 1 | Status: SHIPPED | OUTPATIENT
Start: 2025-07-01

## 2025-07-01 NOTE — TELEPHONE ENCOUNTER
Name of Medication(s) Requested:  Requested Prescriptions     Pending Prescriptions Disp Refills    escitalopram (LEXAPRO) 5 MG tablet [Pharmacy Med Name: ESCITALOPRAM 5 MG TABLET] 90 tablet 1     Sig: TAKE ONE TABLET BY MOUTH EVERY DAY    simvastatin (ZOCOR) 20 MG tablet [Pharmacy Med Name: SIMVASTATIN 20 MG TABLET] 90 tablet 1     Sig: TAKE ONE TABLET BY MOUTH NIGHTLY    lisinopril (PRINIVIL;ZESTRIL) 40 MG tablet [Pharmacy Med Name: LISINOPRIL 40 MG TABLET] 90 tablet 1     Sig: TAKE ONE TABLET BY MOUTH EVERY DAY    montelukast (SINGULAIR) 10 MG tablet [Pharmacy Med Name: MONTELUKAST SOD 10 MG TABLET] 90 tablet 1     Sig: TAKE ONE TABLET BY MOUTH EVERY DAY       Medication is on current medication list Yes    Dosage and directions were verified? Yes    Quantity verified: 90 day supply     Pharmacy Verified?  Yes    Last Appointment:  3/12/2025    Future appts:  No future appointments.     (If no appt send self scheduling link. .REFILLAPPT)  Scheduling request sent?     [] Yes  [x] No    Does patient need updated?  [] Yes  [x] No

## 2025-08-11 DIAGNOSIS — E87.0 HYPERNATREMIA: ICD-10-CM

## 2025-08-11 DIAGNOSIS — E03.9 HYPOTHYROIDISM, UNSPECIFIED TYPE: ICD-10-CM

## 2025-08-11 LAB
ANION GAP SERPL CALCULATED.3IONS-SCNC: 18 MMOL/L (ref 7–16)
BUN BLDV-MCNC: 19 MG/DL (ref 8–23)
CALCIUM SERPL-MCNC: 10.2 MG/DL (ref 8.8–10.2)
CHLORIDE BLD-SCNC: 105 MMOL/L (ref 98–107)
CO2: 21 MMOL/L (ref 22–29)
CREAT SERPL-MCNC: 0.9 MG/DL (ref 0.5–1)
GFR, ESTIMATED: 68 ML/MIN/1.73M2
GLUCOSE BLD-MCNC: 117 MG/DL (ref 74–99)
POTASSIUM SERPL-SCNC: 3.9 MMOL/L (ref 3.5–5.1)
SODIUM BLD-SCNC: 144 MMOL/L (ref 136–145)
T4 FREE: 0.8 NG/DL (ref 0.9–1.7)
TSH SERPL DL<=0.05 MIU/L-ACNC: 4.72 UIU/ML (ref 0.27–4.2)

## 2025-08-20 DIAGNOSIS — E03.9 HYPOTHYROIDISM, UNSPECIFIED TYPE: ICD-10-CM

## 2025-08-20 RX ORDER — LEVOTHYROXINE SODIUM 75 UG/1
75 TABLET ORAL DAILY
Qty: 90 TABLET | Refills: 1 | Status: SHIPPED | OUTPATIENT
Start: 2025-08-20

## (undated) DEVICE — FORCEPS BX OVL CUP FEN DISPOSABLE CAP L 160CM RAD JAW 4

## (undated) DEVICE — BLOCK BITE 60FR RUBBER ADLT DENTAL

## (undated) DEVICE — GLOVE,SURG,SIGNATURE LTX MICR,LTX,PF,7.0: Brand: MEDLINE

## (undated) DEVICE — SPONGE GZ W4XL4IN RAYON POLY CVR W/NONWOVEN FAB STRL 2/PK

## (undated) DEVICE — GRADUATE TRIANG MEASURE 1000ML BLK PRNT